# Patient Record
Sex: FEMALE | Race: BLACK OR AFRICAN AMERICAN | NOT HISPANIC OR LATINO | Employment: FULL TIME | URBAN - METROPOLITAN AREA
[De-identification: names, ages, dates, MRNs, and addresses within clinical notes are randomized per-mention and may not be internally consistent; named-entity substitution may affect disease eponyms.]

---

## 2017-09-06 ENCOUNTER — HISTORICAL (OUTPATIENT)
Dept: ADMINISTRATIVE | Facility: HOSPITAL | Age: 32
End: 2017-09-06

## 2017-09-09 LAB — FINAL CULTURE: NORMAL

## 2020-04-11 ENCOUNTER — HISTORICAL (OUTPATIENT)
Dept: LAB | Facility: HOSPITAL | Age: 35
End: 2020-04-11

## 2020-04-11 LAB — SARS-COV-2 RNA RESP QL NAA+PROBE: NOT DETECTED

## 2020-07-09 ENCOUNTER — HISTORICAL (OUTPATIENT)
Dept: RADIOLOGY | Facility: HOSPITAL | Age: 35
End: 2020-07-09

## 2021-09-21 ENCOUNTER — HISTORICAL (OUTPATIENT)
Dept: ADMINISTRATIVE | Facility: HOSPITAL | Age: 36
End: 2021-09-21

## 2022-03-31 ENCOUNTER — HISTORICAL (OUTPATIENT)
Dept: ADMINISTRATIVE | Facility: HOSPITAL | Age: 37
End: 2022-03-31

## 2022-04-27 ENCOUNTER — HISTORICAL (OUTPATIENT)
Dept: ADMINISTRATIVE | Facility: HOSPITAL | Age: 37
End: 2022-04-27
Payer: MEDICAID

## 2022-08-01 ENCOUNTER — HOSPITAL ENCOUNTER (EMERGENCY)
Facility: HOSPITAL | Age: 37
Discharge: HOME | End: 2022-08-01
Attending: STUDENT IN AN ORGANIZED HEALTH CARE EDUCATION/TRAINING PROGRAM
Payer: MEDICAID

## 2022-08-01 VITALS
HEIGHT: 66 IN | TEMPERATURE: 99 F | RESPIRATION RATE: 20 BRPM | WEIGHT: 270 LBS | DIASTOLIC BLOOD PRESSURE: 91 MMHG | HEART RATE: 67 BPM | SYSTOLIC BLOOD PRESSURE: 151 MMHG | OXYGEN SATURATION: 99 % | BODY MASS INDEX: 43.39 KG/M2

## 2022-08-01 DIAGNOSIS — R07.89 ATYPICAL CHEST PAIN: Primary | ICD-10-CM

## 2022-08-01 LAB
ALBUMIN SERPL-MCNC: 4.3 GM/DL (ref 3.5–5)
ALBUMIN/GLOB SERPL: 1.1 RATIO (ref 1.1–2)
ALP SERPL-CCNC: 52 UNIT/L (ref 40–150)
ALT SERPL-CCNC: 7 UNIT/L (ref 0–55)
AMORPH URATE CRY URNS QL MICRO: ABNORMAL /HPF
APPEARANCE UR: ABNORMAL
AST SERPL-CCNC: 12 UNIT/L (ref 5–34)
BACTERIA #/AREA URNS AUTO: ABNORMAL /HPF
BASOPHILS # BLD AUTO: 0.05 X10(3)/MCL (ref 0–0.2)
BASOPHILS NFR BLD AUTO: 0.6 %
BILIRUB UR QL STRIP.AUTO: NEGATIVE MG/DL
BILIRUBIN DIRECT+TOT PNL SERPL-MCNC: 0.3 MG/DL
BUN SERPL-MCNC: 12.8 MG/DL (ref 7–18.7)
CALCIUM SERPL-MCNC: 10 MG/DL (ref 8.4–10.2)
CHLORIDE SERPL-SCNC: 103 MMOL/L (ref 98–107)
CO2 SERPL-SCNC: 22 MMOL/L (ref 22–29)
COLOR UR AUTO: YELLOW
CREAT SERPL-MCNC: 0.83 MG/DL (ref 0.55–1.02)
EOSINOPHIL # BLD AUTO: 0.04 X10(3)/MCL (ref 0–0.9)
EOSINOPHIL NFR BLD AUTO: 0.5 %
ERYTHROCYTE [DISTWIDTH] IN BLOOD BY AUTOMATED COUNT: 12.6 % (ref 11.5–17)
GLOBULIN SER-MCNC: 3.9 GM/DL (ref 2.4–3.5)
GLUCOSE SERPL-MCNC: 104 MG/DL (ref 74–100)
GLUCOSE UR QL STRIP.AUTO: NEGATIVE MG/DL
HCT VFR BLD AUTO: 42.3 % (ref 37–47)
HGB BLD-MCNC: 13.9 GM/DL (ref 12–16)
IMM GRANULOCYTES # BLD AUTO: 0.02 X10(3)/MCL (ref 0–0.04)
IMM GRANULOCYTES NFR BLD AUTO: 0.2 %
KETONES UR QL STRIP.AUTO: NEGATIVE MG/DL
LEUKOCYTE ESTERASE UR QL STRIP.AUTO: ABNORMAL UNIT/L
LIPASE SERPL-CCNC: 27 U/L
LYMPHOCYTES # BLD AUTO: 2.33 X10(3)/MCL (ref 0.6–4.6)
LYMPHOCYTES NFR BLD AUTO: 28.2 %
MCH RBC QN AUTO: 28.4 PG (ref 27–31)
MCHC RBC AUTO-ENTMCNC: 32.9 MG/DL (ref 33–36)
MCV RBC AUTO: 86.5 FL (ref 80–94)
MONOCYTES # BLD AUTO: 0.61 X10(3)/MCL (ref 0.1–1.3)
MONOCYTES NFR BLD AUTO: 7.4 %
MUCOUS THREADS URNS QL MICRO: ABNORMAL /LPF
NEUTROPHILS # BLD AUTO: 5.2 X10(3)/MCL (ref 2.1–9.2)
NEUTROPHILS NFR BLD AUTO: 63.1 %
NITRITE UR QL STRIP.AUTO: NEGATIVE
NRBC BLD AUTO-RTO: 0 %
PH UR STRIP.AUTO: 5 [PH]
PLATELET # BLD AUTO: 368 X10(3)/MCL (ref 130–400)
PMV BLD AUTO: 8.3 FL (ref 7.4–10.4)
POTASSIUM SERPL-SCNC: 3.7 MMOL/L (ref 3.5–5.1)
PROT SERPL-MCNC: 8.2 GM/DL (ref 6.4–8.3)
PROT UR QL STRIP.AUTO: NEGATIVE MG/DL
RBC # BLD AUTO: 4.89 X10(6)/MCL (ref 4.2–5.4)
RBC #/AREA URNS AUTO: ABNORMAL /HPF
RBC UR QL AUTO: NEGATIVE UNIT/L
SODIUM SERPL-SCNC: 138 MMOL/L (ref 136–145)
SP GR UR STRIP.AUTO: >=1.03
SQUAMOUS #/AREA URNS AUTO: ABNORMAL /HPF
TROPONIN I SERPL-MCNC: <0.01 NG/ML (ref 0–0.04)
TSH SERPL-ACNC: 0.7 UIU/ML (ref 0.35–4.94)
UROBILINOGEN UR STRIP-ACNC: 0.2 MG/DL
WBC # SPEC AUTO: 8.3 X10(3)/MCL (ref 4.5–11.5)
WBC #/AREA URNS AUTO: ABNORMAL /HPF

## 2022-08-01 PROCEDURE — 81001 URINALYSIS AUTO W/SCOPE: CPT | Performed by: STUDENT IN AN ORGANIZED HEALTH CARE EDUCATION/TRAINING PROGRAM

## 2022-08-01 PROCEDURE — 99285 EMERGENCY DEPT VISIT HI MDM: CPT | Mod: 25

## 2022-08-01 PROCEDURE — 36415 COLL VENOUS BLD VENIPUNCTURE: CPT | Performed by: STUDENT IN AN ORGANIZED HEALTH CARE EDUCATION/TRAINING PROGRAM

## 2022-08-01 PROCEDURE — 84443 ASSAY THYROID STIM HORMONE: CPT | Performed by: STUDENT IN AN ORGANIZED HEALTH CARE EDUCATION/TRAINING PROGRAM

## 2022-08-01 PROCEDURE — 84484 ASSAY OF TROPONIN QUANT: CPT | Performed by: STUDENT IN AN ORGANIZED HEALTH CARE EDUCATION/TRAINING PROGRAM

## 2022-08-01 PROCEDURE — 80053 COMPREHEN METABOLIC PANEL: CPT | Performed by: STUDENT IN AN ORGANIZED HEALTH CARE EDUCATION/TRAINING PROGRAM

## 2022-08-01 PROCEDURE — 83690 ASSAY OF LIPASE: CPT | Performed by: STUDENT IN AN ORGANIZED HEALTH CARE EDUCATION/TRAINING PROGRAM

## 2022-08-01 PROCEDURE — 85025 COMPLETE CBC W/AUTO DIFF WBC: CPT | Performed by: STUDENT IN AN ORGANIZED HEALTH CARE EDUCATION/TRAINING PROGRAM

## 2022-08-02 DIAGNOSIS — N83.201 BILATERAL OVARIAN CYSTS: Primary | ICD-10-CM

## 2022-08-02 DIAGNOSIS — N83.202 BILATERAL OVARIAN CYSTS: Primary | ICD-10-CM

## 2022-08-02 NOTE — ED PROVIDER NOTES
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  Patient states that sitting ongoing for last few months.  States she was seen at an outside facility and was discharged after negative workup.  She states she has appointment with a cardiologist on 08/29.  Patient states that she has been having more discomfort for last 2 days and decided to come get checked out today.  Patient states the pain is worse with movement and better with rest.  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  She denies having any worsening pain on exertion.  Denies any shortness of breath or pain with breathing.  No dizziness or lightheadedness.  No abdominal pain, nausea vomiting constipation or diarrhea.  States that the pain is a dull pain.  States she is currently at a 4/10 pain at worst is 10/10.    Review of patient's allergies indicates:   Allergen Reactions    Aspirin Hives     Past Medical History:   Diagnosis Date    Hypertension     Obesity      Past Surgical History:   Procedure Laterality Date    BILATERAL TUBAL LIGATION      CHOLECYSTECTOMY       History reviewed. No pertinent family history.  Social History     Tobacco Use    Smoking status: Never Smoker    Smokeless tobacco: Never Used     Review of Systems   Constitutional: Negative for fever.   Respiratory: Negative for cough and shortness of breath.    Cardiovascular: Positive for chest pain.   Gastrointestinal: Negative for abdominal pain.   All other systems reviewed and are negative.      Physical Exam     Initial Vitals [08/01/22 1809]   BP Pulse Resp Temp SpO2   (!) 155/111 100 20 98.6 °F  (37 °C) 100 %      MAP       --         Physical Exam    Nursing note and vitals reviewed.  Constitutional: She appears well-developed and well-nourished. She is Obese . No distress.   Cardiovascular: Normal rate and regular rhythm.   Pulmonary/Chest: Breath sounds normal. No respiratory distress. She exhibits tenderness (Mild left-sided sternal costal border tenderness with palpation reproducing patient's complaint).   Abdominal: Abdomen is soft. Bowel sounds are normal. There is no abdominal tenderness. There is no rebound.   Musculoskeletal:         General: No tenderness. Normal range of motion.     Neurological: She is alert and oriented to person, place, and time.   Skin: Skin is warm. Capillary refill takes less than 2 seconds.   Psychiatric: She has a normal mood and affect. Thought content normal.         ED Course   Procedures  Labs Reviewed   COMPREHENSIVE METABOLIC PANEL - Abnormal; Notable for the following components:       Result Value    Glucose Level 104 (*)     Globulin 3.9 (*)     All other components within normal limits   URINALYSIS, REFLEX TO URINE CULTURE - Abnormal; Notable for the following components:    Appearance, UA Hazy (*)     Leukocyte Esterase, UA Small (*)     All other components within normal limits   CBC WITH DIFFERENTIAL - Abnormal; Notable for the following components:    MCHC 32.9 (*)     All other components within normal limits   URINALYSIS, MICROSCOPIC - Abnormal; Notable for the following components:    Bacteria, UA Few (*)     Mucous, UA Trace (*)     Amporphous Urate Crystals, UA Few (*)     WBC, UA 6-10 (*)     Squamous Epithelial Cells, UA Many (*)     All other components within normal limits   LIPASE - Normal   TROPONIN I - Normal   TSH - Normal   CBC W/ AUTO DIFFERENTIAL    Narrative:     The following orders were created for panel order CBC auto differential.  Procedure                               Abnormality         Status                     ---------                                -----------         ------                     CBC with Differential[[id number]]        Abnormal            Final result                 Please view results for these tests on the individual orders.     EKG Readings: (Independently Interpreted)   Initial Reading: No STEMI. Rhythm: Normal Sinus Rhythm. Heart Rate: 79. Ectopy: No Ectopy. Conduction: Normal. ST Segments: Normal ST Segments. T Waves: Normal. Clinical Impression: Normal Sinus Rhythm       Imaging Results          X-Ray Chest AP Portable (Preliminary result)  Result time 08/01/22 19:33:20    ED Interpretation by Andres Ugalde MD (08/01/22 19:33:20, [facility] - Emergency Dept, Emergency Medicine)    No consolidations appreciated.                               Medications - No data to display  Medical Decision Making:   Differential Diagnosis:   ACS, atypical chest pain, costochondritis, GERD, pancreatitis             ED Course as of 08/01/22 2037   Mon Aug 01, 2022   2034 Patient resting comfortably in bed no acute distress.  Vital signs are stable.  No chest pain unless palpated to the pec muscle on the left.  Patient will follow-up with PCP and [facility].  Strict ER precautions given. [BS]      ED Course User Index  [BS] Andres Ugalde MD             Clinical Impression:   Final diagnoses:  [R07.89] Atypical chest pain (Primary)          ED Disposition Condition    Discharge Stable        ED Prescriptions     None        Follow-up Information     Follow up With Specialties Details Why Contact Info    [facility] - Emergency Dept Emergency Medicine Go to  If symptoms worsen [id number]  [facility] [address] [phone]    Follow-up with PCP and cardiologist               Andres Ugalde MD  08/01/22 2037

## 2022-08-11 ENCOUNTER — HOSPITAL ENCOUNTER (OUTPATIENT)
Dept: RADIOLOGY | Facility: HOSPITAL | Age: 37
Discharge: HOME | End: 2022-08-11
Attending: NURSE PRACTITIONER
Payer: MEDICAID

## 2022-08-11 DIAGNOSIS — N83.202 BILATERAL OVARIAN CYSTS: ICD-10-CM

## 2022-08-11 DIAGNOSIS — N83.201 BILATERAL OVARIAN CYSTS: ICD-10-CM

## 2022-08-11 PROCEDURE — 76830 TRANSVAGINAL US NON-OB: CPT | Mod: TC

## 2022-08-26 ENCOUNTER — HOSPITAL ENCOUNTER (EMERGENCY)
Facility: HOSPITAL | Age: 37
Discharge: HOME | End: 2022-08-26
Attending: FAMILY MEDICINE
Payer: MEDICAID

## 2022-08-26 VITALS
WEIGHT: 260 LBS | HEART RATE: 104 BPM | DIASTOLIC BLOOD PRESSURE: 104 MMHG | TEMPERATURE: 101 F | OXYGEN SATURATION: 98 % | RESPIRATION RATE: 20 BRPM | HEIGHT: 66 IN | SYSTOLIC BLOOD PRESSURE: 142 MMHG | BODY MASS INDEX: 41.78 KG/M2

## 2022-08-26 DIAGNOSIS — U07.1 COVID-19: ICD-10-CM

## 2022-08-26 DIAGNOSIS — J06.9 UPPER RESPIRATORY TRACT INFECTION, UNSPECIFIED TYPE: Primary | ICD-10-CM

## 2022-08-26 LAB
FLUAV AG UPPER RESP QL IA.RAPID: NOT DETECTED
FLUBV AG UPPER RESP QL IA.RAPID: NOT DETECTED
SARS-COV-2 RNA RESP QL NAA+PROBE: DETECTED

## 2022-08-26 PROCEDURE — 99284 EMERGENCY DEPT VISIT MOD MDM: CPT | Mod: 25

## 2022-08-26 PROCEDURE — 87636 SARSCOV2 & INF A&B AMP PRB: CPT | Performed by: FAMILY MEDICINE

## 2022-08-26 PROCEDURE — 25000003 PHARM REV CODE 250: Performed by: FAMILY MEDICINE

## 2022-08-26 RX ORDER — BENZONATATE 100 MG/1
100 CAPSULE ORAL 3 TIMES DAILY PRN
Qty: 20 CAPSULE | Refills: 0 | Status: SHIPPED | OUTPATIENT
Start: 2022-08-26 | End: 2022-09-16

## 2022-08-26 RX ORDER — ACETAMINOPHEN 500 MG
1000 TABLET ORAL
Status: COMPLETED | OUTPATIENT
Start: 2022-08-26 | End: 2022-08-26

## 2022-08-26 RX ORDER — ONDANSETRON 4 MG/1
4 TABLET, ORALLY DISINTEGRATING ORAL EVERY 6 HOURS PRN
Qty: 10 TABLET | Refills: 0 | Status: SHIPPED | OUTPATIENT
Start: 2022-08-26 | End: 2022-08-31

## 2022-08-26 RX ORDER — CETIRIZINE HYDROCHLORIDE 10 MG/1
10 TABLET ORAL DAILY
Qty: 14 TABLET | Refills: 0 | Status: ON HOLD | OUTPATIENT
Start: 2022-08-26 | End: 2023-11-17

## 2022-08-26 RX ORDER — FLUTICASONE PROPIONATE 50 MCG
2 SPRAY, SUSPENSION (ML) NASAL DAILY
Qty: 15 G | Refills: 0 | Status: ON HOLD | OUTPATIENT
Start: 2022-08-26 | End: 2023-11-17 | Stop reason: HOSPADM

## 2022-08-26 RX ADMIN — ACETAMINOPHEN 1000 MG: 500 TABLET ORAL at 09:08

## 2022-08-26 NOTE — ED TRIAGE NOTES
C/o fever of 101.3, nasal congestion, body aches, and coughing x 2 days. Febrile and HTN in triage

## 2022-08-26 NOTE — Clinical Note
"Sonia"Wendy Stock was seen and treated in our emergency department on 8/26/2022.     COVID-19 is present in our communities across the state. There is limited testing for COVID at this time, so not all patients can be tested. In this situation, your employee meets the following criteria:    Sonia Stock has met the criteria for COVID-19 testing and has a POSITIVE result. She can return to work once they are asymptomatic for 24 hours without the use of fever reducing medications AND at least five days from the first positive result. A mask is recommended for 5 days post quarantine.     If you have any questions or concerns, or if I can be of further assistance, please do not hesitate to contact me.    Sincerely,             Yolanda GLORIA"

## 2022-08-26 NOTE — Clinical Note
"Sonia"Wendy Stock was seen and treated in our emergency department on 8/26/2022.     COVID-19 is present in our communities across the state. There is limited testing for COVID at this time, so not all patients can be tested. In this situation, your employee meets the following criteria:    Sonia Stock has met the criteria for COVID-19 testing and has a POSITIVE result. She can return to work once they are asymptomatic for 24 hours without the use of fever reducing medications AND at least five days from the first positive result. A mask is recommended for 5 days post quarantine.     If you have any questions or concerns, or if I can be of further assistance, please do not hesitate to contact me.    Sincerely,             pancho GLORIA"

## 2022-08-26 NOTE — ED PROVIDER NOTES
Encounter Date: 8/26/2022       History     Chief Complaint   Patient presents with    Fever     47 y/o female presents to the ER with complaints of cough, body aches and fever that began yesterday afternoon.  Symptoms are moderate, nothing makes better or worse.  No chest pain, no dyspnea, no abdominal pain, nausea or vomiting.  History of hypertension, but did not take medications this morning.        Review of patient's allergies indicates:   Allergen Reactions    Aspirin Hives     Past Medical History:   Diagnosis Date    Hypertension     Obesity      Past Surgical History:   Procedure Laterality Date    BILATERAL TUBAL LIGATION      CHOLECYSTECTOMY      DILATION AND CURETTAGE OF UTERUS      TUBAL LIGATION       History reviewed. No pertinent family history.  Social History     Tobacco Use    Smoking status: Never Smoker    Smokeless tobacco: Never Used     Review of Systems   Constitutional: Positive for chills, fatigue and fever.   HENT: Positive for congestion, rhinorrhea and sore throat. Negative for ear pain.    Eyes: Negative for photophobia and pain.   Respiratory: Positive for cough. Negative for shortness of breath and wheezing.    Cardiovascular: Negative for chest pain.   Gastrointestinal: Negative for abdominal pain, diarrhea, nausea and vomiting.   Genitourinary: Negative for dysuria.   Musculoskeletal: Negative for back pain.   Skin: Negative for rash.   Neurological: Positive for headaches. Negative for dizziness and weakness.   Hematological: Does not bruise/bleed easily.   All other systems reviewed and are negative.      Physical Exam     Initial Vitals [08/26/22 0901]   BP Pulse Resp Temp SpO2   (!) 142/104 104 20 (!) 100.7 °F (38.2 °C) 98 %      MAP       --         Physical Exam    Nursing note and vitals reviewed.  Constitutional: She appears well-developed and well-nourished. No distress.   HENT:   Head: Normocephalic and atraumatic.   Mouth/Throat: Oropharynx is clear and moist.    Eyes: Conjunctivae and EOM are normal. Pupils are equal, round, and reactive to light.   Neck: Neck supple.   Normal range of motion.  Cardiovascular: Normal rate, regular rhythm and normal heart sounds.   Pulmonary/Chest: Breath sounds normal. No respiratory distress. She has no wheezes. She has no rhonchi. She has no rales.   Abdominal: Abdomen is soft. Bowel sounds are normal. She exhibits no distension. There is no abdominal tenderness. There is no rebound and no guarding.   Musculoskeletal:         General: Normal range of motion.      Cervical back: Normal range of motion and neck supple.     Neurological: She is alert and oriented to person, place, and time.   Skin: Skin is warm and dry. Capillary refill takes less than 2 seconds. No erythema.   Psychiatric: She has a normal mood and affect. Her behavior is normal. Judgment and thought content normal.         ED Course   Procedures  Labs Reviewed   COVID/FLU A&B PCR - Abnormal; Notable for the following components:       Result Value    SARS-CoV-2 PCR Detected (*)     All other components within normal limits          Imaging Results    None          Medications   acetaminophen tablet 1,000 mg (1,000 mg Oral Given 8/26/22 0912)     Medical Decision Making:   Differential Diagnosis:   Upper respiratory infection, COVID 19.                      Clinical Impression:   Final diagnoses:  [J06.9] Upper respiratory tract infection, unspecified type (Primary)  [U07.1] COVID-19          ED Disposition Condition    Discharge Stable        ED Prescriptions     Medication Sig Dispense Start Date End Date Auth. Provider    benzonatate (TESSALON) 100 MG capsule Take 1 capsule (100 mg total) by mouth 3 (three) times daily as needed for Cough. 20 capsule 8/26/2022 9/16/2022 Faustino Forman MD    fluticasone propionate (FLONASE) 50 mcg/actuation nasal spray 2 sprays (100 mcg total) by Each Nostril route once daily at 6am. 15 g 8/26/2022  Faustino Forman MD     cetirizine (ZYRTEC) 10 MG tablet Take 1 tablet (10 mg total) by mouth once daily. for 14 days 14 tablet 8/26/2022 9/9/2022 Faustino Forman MD    ondansetron (ZOFRAN-ODT) 4 MG TbDL Take 1 tablet (4 mg total) by mouth every 6 (six) hours as needed (nausea vomiting). 10 tablet 8/26/2022 8/31/2022 Faustino Forman MD        Follow-up Information     Follow up With Specialties Details Why Contact Info    [facility] - Emergency Dept Emergency Medicine  As needed, If symptoms worsen [address]  [facility] [address] [phone]    Primary Care Physician  In 5 days             Faustino Forman MD  08/26/22 8698

## 2023-01-09 ENCOUNTER — HOSPITAL ENCOUNTER (EMERGENCY)
Facility: HOSPITAL | Age: 38
Discharge: HOME | End: 2023-01-09
Attending: EMERGENCY MEDICINE
Payer: MEDICAID

## 2023-01-09 VITALS
HEIGHT: 66 IN | BODY MASS INDEX: 38.57 KG/M2 | OXYGEN SATURATION: 98 % | HEART RATE: 67 BPM | TEMPERATURE: 98 F | RESPIRATION RATE: 18 BRPM | WEIGHT: 240 LBS | SYSTOLIC BLOOD PRESSURE: 153 MMHG | DIASTOLIC BLOOD PRESSURE: 106 MMHG

## 2023-01-09 DIAGNOSIS — R07.9 CHEST PAIN: ICD-10-CM

## 2023-01-09 DIAGNOSIS — R07.89 CHEST WALL PAIN: Primary | ICD-10-CM

## 2023-01-09 PROCEDURE — 93010 EKG 12-LEAD: ICD-10-PCS | Mod: ,,, | Performed by: INTERNAL MEDICINE

## 2023-01-09 PROCEDURE — 63600175 PHARM REV CODE 636 W HCPCS: Performed by: EMERGENCY MEDICINE

## 2023-01-09 PROCEDURE — 96372 THER/PROPH/DIAG INJ SC/IM: CPT | Performed by: EMERGENCY MEDICINE

## 2023-01-09 PROCEDURE — 93010 ELECTROCARDIOGRAM REPORT: CPT | Mod: ,,, | Performed by: INTERNAL MEDICINE

## 2023-01-09 PROCEDURE — 99284 EMERGENCY DEPT VISIT MOD MDM: CPT | Mod: 25

## 2023-01-09 PROCEDURE — 93005 ELECTROCARDIOGRAM TRACING: CPT

## 2023-01-09 RX ORDER — KETOROLAC TROMETHAMINE 10 MG/1
10 TABLET, FILM COATED ORAL EVERY 6 HOURS
Qty: 20 TABLET | Refills: 0 | Status: SHIPPED | OUTPATIENT
Start: 2023-01-09 | End: 2023-01-14

## 2023-01-09 RX ORDER — KETOROLAC TROMETHAMINE 30 MG/ML
60 INJECTION, SOLUTION INTRAMUSCULAR; INTRAVENOUS
Status: COMPLETED | OUTPATIENT
Start: 2023-01-09 | End: 2023-01-09

## 2023-01-09 RX ORDER — CYCLOBENZAPRINE HCL 10 MG
10 TABLET ORAL 3 TIMES DAILY PRN
Qty: 15 TABLET | Refills: 0 | Status: SHIPPED | OUTPATIENT
Start: 2023-01-09 | End: 2023-01-14

## 2023-01-09 RX ORDER — HYDROCODONE BITARTRATE AND ACETAMINOPHEN 7.5; 325 MG/1; MG/1
1 TABLET ORAL EVERY 6 HOURS PRN
Qty: 20 TABLET | Refills: 0 | Status: SHIPPED | OUTPATIENT
Start: 2023-01-09 | End: 2023-01-14

## 2023-01-09 RX ADMIN — KETOROLAC TROMETHAMINE 60 MG: 30 INJECTION, SOLUTION INTRAMUSCULAR; INTRAVENOUS at 02:01

## 2023-01-09 NOTE — ED TRIAGE NOTES
Pt complaint of chest and left upper back pain today with movement  relating that she was dylan by Dr Ag last week with a normal check up, except that she hasnot gotten cholesterol RX yet. Pt denies injury

## 2023-01-09 NOTE — ED PROVIDER NOTES
Encounter Date: 1/9/2023       History     Chief Complaint   Patient presents with    Pleurisy     Pt complaint of chest and left upper back pain today with movement  relating that she was dylan by Dr Ag last week with a normal check up, except that she hasnot gotten cholesterol RX yet. Pt denies injury     The history is provided by the patient. No  was used.   Chest Pain  The current episode started today (woke with symptoms this AM). Duration of episode(s) is 1 day. Chest pain occurs constantly. The quality of the pain is described as aching. The pain does not radiate. Chest pain is worsened by certain positions and deep breathing. Pertinent negatives for primary symptoms include no fever, no shortness of breath and no nausea.   Pertinent negatives for associated symptoms include no weakness. She tried nothing for the symptoms. Risk factors include obesity.   Her past medical history is significant for hyperlipidemia and hypertension.   Woke this AM with left scapular pain radiating into left chest.  Denies injury/trauma.  Was in her normal state of health when she went to bed last night.    Review of patient's allergies indicates:   Allergen Reactions    Aspirin Hives     Past Medical History:   Diagnosis Date    Hypertension     Obesity      Past Surgical History:   Procedure Laterality Date    BILATERAL TUBAL LIGATION      CHOLECYSTECTOMY      DILATION AND CURETTAGE OF UTERUS      TUBAL LIGATION       No family history on file.  Social History     Tobacco Use    Smoking status: Never    Smokeless tobacco: Never     Review of Systems   Constitutional:  Negative for fever.   HENT:  Negative for sore throat.    Respiratory:  Negative for shortness of breath.    Cardiovascular:  Positive for chest pain.   Gastrointestinal:  Negative for nausea.   Genitourinary:  Negative for dysuria.   Musculoskeletal:  Negative for back pain.   Skin:  Negative for rash.   Neurological:  Negative for  weakness.   Hematological:  Does not bruise/bleed easily.     Physical Exam     Initial Vitals [01/09/23 1333]   BP Pulse Resp Temp SpO2   (!) 153/106 67 18 98 °F (36.7 °C) 98 %      MAP       --         Physical Exam    Nursing note and vitals reviewed.  Constitutional: She appears well-developed and well-nourished.   HENT:   Head: Normocephalic and atraumatic.   Right Ear: External ear normal.   Left Ear: External ear normal.   Eyes: Conjunctivae and EOM are normal. Pupils are equal, round, and reactive to light.   Neck: Neck supple.   Normal range of motion.  Cardiovascular:  Normal rate, regular rhythm, normal heart sounds and intact distal pulses.           Pulmonary/Chest: Breath sounds normal.   Abdominal: Abdomen is soft. Bowel sounds are normal.   Musculoskeletal:         General: Normal range of motion.        Arms:       Cervical back: Normal range of motion and neck supple.        Back:      Neurological: She is alert and oriented to person, place, and time. GCS score is 15. GCS eye subscore is 4. GCS verbal subscore is 5. GCS motor subscore is 6.   Skin: Skin is warm and dry. Capillary refill takes less than 2 seconds.   Psychiatric: She has a normal mood and affect. Her behavior is normal. Judgment and thought content normal.       ED Course   Procedures  Labs Reviewed - No data to display  EKG Readings: (Independently Interpreted)   Initial Reading: No STEMI. Rhythm: Normal Sinus Rhythm. Heart Rate: 60. Ectopy: No Ectopy. Conduction: Normal. ST Segments: Normal ST Segments. T Waves: Normal. Axis: Normal. Clinical Impression: Normal Sinus Rhythm     Imaging Results              X-Ray Chest PA And Lateral (Final result)  Result time 01/09/23 14:04:50      Final result by Brett Burroughs MD (01/09/23 14:04:50)                   Impression:      No acute cardiopulmonary process identified.      Electronically signed by: Brett Burroughs  Date:    01/09/2023  Time:    14:04               Narrative:     EXAMINATION:  XR CHEST PA AND LATERAL    CLINICAL HISTORY:  Chest pain, unspecified    TECHNIQUE:  Two views    COMPARISON:  August 1, 2022.    FINDINGS:  Cardiopericardial silhouette is within normal limits. Lungs are without dense focal or segmental consolidation, congestion, pleural effusion or pneumothorax.                                  Pain appears to be MSK in nature.  Will get EKG and CXR to r/o more serious causes of CP.  She is allergic to ASA but tolerates other NSAID's so will order IM ketorolac.  If CXR and EKG are unremarkable, will treat with analgesics and muscle relaxants.     Medications   ketorolac injection 60 mg (60 mg Intramuscular Given 1/9/23 0060)         EKG and CXR normal. Will treat for MSK etiology                     Clinical Impression:   Final diagnoses:  [R07.9] Chest pain  [R07.89] Chest wall pain (Primary)        ED Disposition Condition    Discharge Stable          ED Prescriptions       Medication Sig Dispense Start Date End Date Auth. Provider    ketorolac (TORADOL) 10 mg tablet Take 1 tablet (10 mg total) by mouth every 6 (six) hours. for 5 days 20 tablet 1/9/2023 1/14/2023 Tony Padilla MD    HYDROcodone-acetaminophen (NORCO) 7.5-325 mg per tablet Take 1 tablet by mouth every 6 (six) hours as needed for Pain. 20 tablet 1/9/2023 1/14/2023 Tony Padilla MD    cyclobenzaprine (FLEXERIL) 10 MG tablet Take 1 tablet (10 mg total) by mouth 3 (three) times daily as needed for Muscle spasms. 15 tablet 1/9/2023 1/14/2023 Tony Padilla MD          Follow-up Information       Follow up With Specialties Details Why Contact Info    Follow up with your primary MD in 3-5 days if not improved.  Return to ED for worsening symptoms.                 Tony Padilla MD  01/09/23 8892

## 2023-03-23 ENCOUNTER — HOSPITAL ENCOUNTER (EMERGENCY)
Facility: HOSPITAL | Age: 38
Discharge: HOME | End: 2023-03-23
Attending: INTERNAL MEDICINE
Payer: MEDICAID

## 2023-03-23 VITALS
SYSTOLIC BLOOD PRESSURE: 149 MMHG | TEMPERATURE: 98 F | HEIGHT: 66 IN | DIASTOLIC BLOOD PRESSURE: 105 MMHG | OXYGEN SATURATION: 98 % | WEIGHT: 240 LBS | RESPIRATION RATE: 20 BRPM | BODY MASS INDEX: 38.57 KG/M2 | HEART RATE: 67 BPM

## 2023-03-23 DIAGNOSIS — T78.49XA ALLERGIC REACTION TO ADHESIVE: ICD-10-CM

## 2023-03-23 DIAGNOSIS — R60.0 PERIORBITAL EDEMA OF BOTH EYES: Primary | ICD-10-CM

## 2023-03-23 PROCEDURE — 96372 THER/PROPH/DIAG INJ SC/IM: CPT | Performed by: INTERNAL MEDICINE

## 2023-03-23 PROCEDURE — 99284 EMERGENCY DEPT VISIT MOD MDM: CPT

## 2023-03-23 PROCEDURE — 63600175 PHARM REV CODE 636 W HCPCS: Performed by: INTERNAL MEDICINE

## 2023-03-23 RX ORDER — METHYLPREDNISOLONE SOD SUCC 125 MG
125 VIAL (EA) INJECTION ONCE
Status: COMPLETED | OUTPATIENT
Start: 2023-03-23 | End: 2023-03-23

## 2023-03-23 RX ORDER — DIPHENHYDRAMINE HYDROCHLORIDE 50 MG/ML
50 INJECTION INTRAMUSCULAR; INTRAVENOUS ONCE
Status: COMPLETED | OUTPATIENT
Start: 2023-03-23 | End: 2023-03-23

## 2023-03-23 RX ORDER — PREDNISONE 20 MG/1
20 TABLET ORAL DAILY
Qty: 5 TABLET | Refills: 0 | Status: SHIPPED | OUTPATIENT
Start: 2023-03-23 | End: 2023-03-28

## 2023-03-23 RX ADMIN — METHYLPREDNISOLONE SODIUM SUCCINATE 125 MG: 125 INJECTION, POWDER, FOR SOLUTION INTRAMUSCULAR; INTRAVENOUS at 09:03

## 2023-03-23 RX ADMIN — DIPHENHYDRAMINE HYDROCHLORIDE 50 MG: 50 INJECTION, SOLUTION INTRAMUSCULAR; INTRAVENOUS at 09:03

## 2023-03-23 NOTE — Clinical Note
"Sonia Brantley" Montrell was seen and treated in our emergency department on 3/23/2023.  She may return to work on 03/24/2023.       If you have any questions or concerns, please don't hesitate to call.      Bessie     "

## 2023-03-23 NOTE — Clinical Note
"Sonia Brantley" Montrell was seen and treated in our emergency department on 3/23/2023.  She may return to work on 03/24/2023.       If you have any questions or concerns, please don't hesitate to call.      Bessie GLORIA    "

## 2023-03-23 NOTE — ED PROVIDER NOTES
Source of History:  Patient, no limitations    Chief complaint:  Facial Swelling (Pt to er c/o redness, swelling and irritation to bilateral eyelids onset two days ago.)      HPI:  Sonia Stock is a 37 y.o. female presenting with Facial Swelling (Pt to er c/o redness, swelling and irritation to bilateral eyelids onset two days ago.)         Patient presents for evaluation of facial swelling. Onset of symptoms was abrupt starting 2 days ago, with unchanged since that time. Respiratory symptoms include  none . Patient denies dyspnea on exertion, dyspnea while laying down, non productive cough, shortness of breath, and wheezing. Patient denies similar previous allergic reactions. Patient has exposure to new medications or allergens. Care prior to arrival consisted of OTC benadryl, with minimal relief.        Review of Systems   Constitutional symptoms:  Negative except as documented in HPI.   Skin symptoms:  Negative except as documented in HPI.   HEENT symptoms: No vision changes, No FB sensation, No voice changes, no difficulty swallowing  Respiratory symptoms:  Negative except as documented in HPI.   Cardiovascular symptoms:  Negative except as documented in HPI.   Gastrointestinal symptoms:  Negative except as documented in HPI.    Genitourinary symptoms:  Negative except as documented in HPI.   Musculoskeletal symptoms:  Negative except as documented in HPI.   Neurologic symptoms:  Negative except as documented in HPI.   Psychiatric symptoms:  Negative except as documented in HPI.   Allergy/immunologic symptoms:  Negative except as documented in HPI.             Additional review of systems information: All other systems reviewed and otherwise negative.      Review of patient's allergies indicates:   Allergen Reactions    Aspirin Hives       PMH:  As per HPI and below:    Past Medical History:   Diagnosis Date    Hypertension     Obesity         No family history on file.    Past Surgical History:  "  Procedure Laterality Date    BILATERAL TUBAL LIGATION      CHOLECYSTECTOMY      DILATION AND CURETTAGE OF UTERUS      TUBAL LIGATION         Social History     Tobacco Use    Smoking status: Never    Smokeless tobacco: Never       There is no problem list on file for this patient.       Physical Exam:    BP (!) 149/105 (BP Location: Left arm, Patient Position: Sitting)   Pulse 67   Temp 97.9 °F (36.6 °C) (Temporal)   Resp 20   Ht 5' 6" (1.676 m)   Wt 108.9 kg (240 lb)   LMP 03/23/2023 (Exact Date)   SpO2 98%   BMI 38.74 kg/m²     Nursing note and vital signs reviewed.    General:  Alert, no acute distress.   Skin: Normal for Ethnic Origin, No cyanosis  HEENT: Normocephalic and atraumatic, Vision unchanged, Pupils symmetric, No icterus , Nasal mucosa is pink and moist, BL periorbital edema including upper and lower eyelids  Cardiovascular:  Regular rate and rhythm, No edema  Chest Wall: No deformity, equal chest rise  Respiratory:  Lungs are clear to auscultation, respirations are non-labored.    Musculoskeletal:  No deformity, Normal perfusion to all extremities  Gastrointestinal:  Soft, Non distended  Neurological:  Alert and oriented, normal motor observed, normal speech observed.    Psychiatric:  Cooperative, appropriate mood & affect.        Labs that have been ordered have been independently reviewed and interpreted by myself.     Old Chart Reviewed.      Initial Impression/ Differential Dx:  Differential Diagnosis includes, but is not limited to:  Drug eruption, allergic reaction/urticatia, irritant/contact dermatitis, viral exanthem, local trauma/contusion, abrasion, cellulitis  MDM:      Reviewed Nurses Note.    Reviewed Pertinent old records.    Orders Placed This Encounter    Visual acuity screening    methylPREDNISolone sodium succinate injection 125 mg    diphenhydrAMINE injection 50 mg    predniSONE (DELTASONE) 20 MG tablet                    Labs Reviewed - No data to display       No orders " to display        No visits with results within 1 Day(s) from this visit.   Latest known visit with results is:   Admission on 08/26/2022, Discharged on 08/26/2022   Component Date Value Ref Range Status    Influenza A PCR 08/26/2022 Not Detected  Not Detected Final    Influenza B PCR 08/26/2022 Not Detected  Not Detected Final    SARS-CoV-2 PCR 08/26/2022 Detected (A)  Not Detected Final       Imaging Results    None                                              Diagnostic Impression:    1. Periorbital edema of both eyes    2. Allergic reaction to adhesive         ED Disposition Condition    Discharge Stable             Follow-up Information       [facility] - Emergency Dept.    Specialty: Emergency Medicine  Why: If symptoms worsen  Contact information:  [address]  [facility] [address] [phone]                            ED Prescriptions       Medication Sig Dispense Start Date End Date Auth. Provider    predniSONE (DELTASONE) 20 MG tablet Take 1 tablet (20 mg total) by mouth once daily. for 5 days 5 tablet 3/23/2023 3/28/2023 Bubba Walker,           Follow-up Information       Follow up With Specialties Details Why Contact Info    [facility] - Emergency Dept Emergency Medicine  If symptoms worsen [address]  [facility] [address]  [phone]             Bubba Walker DO  03/23/23 0914

## 2023-05-12 DIAGNOSIS — K27.9 PEPTIC ULCER WITHOUT HEMORRHAGE OR PERFORATION BUT WITH OBSTRUCTION: Primary | ICD-10-CM

## 2023-05-12 DIAGNOSIS — Z01.818 OTHER SPECIFIED PRE-OPERATIVE EXAMINATION: ICD-10-CM

## 2023-05-12 DIAGNOSIS — K56.609 PEPTIC ULCER WITHOUT HEMORRHAGE OR PERFORATION BUT WITH OBSTRUCTION: Primary | ICD-10-CM

## 2023-05-16 ENCOUNTER — CLINICAL SUPPORT (OUTPATIENT)
Dept: BARIATRICS | Facility: HOSPITAL | Age: 38
End: 2023-05-16

## 2023-05-16 VITALS — WEIGHT: 292 LBS | HEIGHT: 65 IN | BODY MASS INDEX: 48.65 KG/M2

## 2023-05-16 DIAGNOSIS — E66.9 OBESITY, UNSPECIFIED CLASSIFICATION, UNSPECIFIED OBESITY TYPE, UNSPECIFIED WHETHER SERIOUS COMORBIDITY PRESENT: Primary | ICD-10-CM

## 2023-05-16 NOTE — PROGRESS NOTES
"NUTRITIONAL CONSULT    Initial assessment for sleeve gastrectomy   Non Surgical: []    PAST HISTORY:   Dieting attempts include reducing portion sizes,    Highest weight: 298 lbs    CLINICAL DATA:  Height:   Ht Readings from Last 1 Encounters:   23 5' 5" (1.651 m)      Weight:   Wt Readings from Last 3 Encounters:   23 0944 132.5 kg (292 lb)   23 0835 108.9 kg (240 lb)   23 1333 108.9 kg (240 lb)      IBW: 125 lbs   BMI:    BMI Readings from Last 1 Encounters:   23 48.59 kg/m²      Bariatric goal weight (125% EBW): 156 lbs  Patient's goal weight: 145-150 lbs    FAMILY HISTORY OF OBESITY:   unsure            WHAT SIDE OF THE FAMILY?   []Mother   []Father   []Sibling   []Child     []Extended    []Adopted       Goal for Bariatric Surgery: to improve quality of life and to lose weight    NUTRITION & HEALTH HISTORY:  Greatest challenge: dining out frequency and irregular meal patterns    Current diet recall:   Cut back on sodas (drink 5-6 sodas a day) -started diet detailed below  5 am: (if at work will not eat until lunch time 9am -3pm)  9:30-10 am: B: grapefruit and eggs  1:30 L: salad with air fried fish, chicken, tuna, or salmon  Cook for little boy: use to eat with son: fried foods (french fries, fried chicken wings, chicken strips: small for his age and has strict food preferences)  D: fruit    Current Dietary Patterns:  Meal pattern: 1-2  Snackin / day Type: fruit  Vegetables: Likes a variety. Eats almost daily.  Fruits: Likes a variety. Eats daily.  Beverages: water  Alcohol consumption:  special occasions  Type: varies  Dining out:  3-4x  Mostly fast food. Stopped in the last week (gave healthy fast food options)  Grocery shopping and food prep: Yes[x]Self   []Spouse   []Other:   Emotional eating: No Which emotions if yes: n/a  Nighttime snacking: No Middle of night: No Before bedtime: No  Hx of disordered eating behaviors: No Anorexia: No Bulimia: No Purging: " No Binging:No  History of vitamin/mineral deficiencies:   No    Vitamins / Minerals / Herbs:   None at this time (asked about [place])    Food Allergies:   none      ASSESSMENT:  Patient reports attempts at weight loss, only to regain lost weight.  Patient demonstrated knowledge of healthy eating behaviors and exercise patterns; admits to not eating healthy and not exercising at this point.  Patient states willingness to change lifestyle and make behavior modifications.  Expect good  compliance after surgery at this time.        ESTIMATED NEEDS:  Calories: 3395-8037 (20-25 kcal/kg 71 kg adjusted BW/d)  Protein: 71-78.1 (1.0-1.1 g/kg adjusted BW/d)  Fluid:  2650 mL (90 oz) (20 mL/kg actual BW/d)    BARIATRIC DIET DISCUSSION:  Discussed diet after surgery and related to patients food record.  Reinforced that surgery is not a magic bullet and importance of low fat foods and no snacking.  Answered all questions.    RECOMMENDATIONS:  Patient is a good candidate for bariatric surgery.      PLAN:  Resume work-up for surgery.  Continue to review Bariatric Nutrition Guidebook at home and call with any questions.  Begin trying various protein supplements to determine preference.  Include protein source at every meal  Provided: Fueling Well on the Go, Snack List, Protein Supplement List, Protein food list, Bariatric Surgery Pocket Manual

## 2023-05-16 NOTE — PROGRESS NOTES
BEHAVIOR MODIFICATION AND EXERCISE CONSULT    PERSONAL:     What initiated your interest in bariatric surgery? Better quality of life. Tried to lose weight on my own, but it was slow. Have HTN, high cholesterol.     Marital Status: []Single   [x]   []   []      Do you have children? 2 (15 and 12)    What is your highest level of education completed? 12th grade and some college    Who is your social or relational support? sister    Do you work? 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 yes    PHYSICAL ACTIVITY:    Do you currently exercise: [x]Yes   []No    If so, please describe: [place], walk and run    Have you experienced any injuries and/or restrictions that may limit your physical activity? [x]Yes   []No    If so, please describe:     How do you feel about exercise? good    BEHAVIORS:    What behavior(s) would you like to change in order to be healthy? Snacking/sweets, soda    On a scale of 1-10 (1-extremely low, 10-extremely high), how motivated are you to change your behavior(s)? 10    Do you currently use any type of tobacco products (vape, dip, cigarettes, etc.) No    If yes, on average, how many and/or how often do you use these products on a daily basis?    How many hours of sleep do you average? 4    Rate your stress level on a scale of 1-10 (1-extremely low, 10-extremely high) 2    What is your biggest stressor?     Is your appetite affected by stress, boredom, depression, etc.?     How do you cope and/or manage stress?    Have you ever seen a counselor or therapist? Yes for anxiety      CURRENT WEIGHT: 292  HIGHEST WEIGHT: 298  LOWEST ADULT WEIGHT: 135  GOAL WEIGHT: 145-150    WAIST/HIP:48/52.5    HANDOUTS:Emotional connections to food.    NOTES: A body composition was conducted and patient was educated.      Juanita Marte, SHANA, CPT, CHC

## 2023-05-17 ENCOUNTER — HOSPITAL ENCOUNTER (EMERGENCY)
Facility: HOSPITAL | Age: 38
Discharge: HOME | End: 2023-05-17
Attending: EMERGENCY MEDICINE
Payer: MEDICAID

## 2023-05-17 VITALS
OXYGEN SATURATION: 98 % | HEIGHT: 65 IN | BODY MASS INDEX: 48.65 KG/M2 | HEART RATE: 80 BPM | TEMPERATURE: 97 F | RESPIRATION RATE: 20 BRPM | WEIGHT: 292 LBS | SYSTOLIC BLOOD PRESSURE: 133 MMHG | DIASTOLIC BLOOD PRESSURE: 95 MMHG

## 2023-05-17 DIAGNOSIS — R07.9 NONSPECIFIC CHEST PAIN: ICD-10-CM

## 2023-05-17 DIAGNOSIS — J20.9 ACUTE BRONCHITIS, UNSPECIFIED ORGANISM: Primary | ICD-10-CM

## 2023-05-17 DIAGNOSIS — R07.9 CHEST PAIN: ICD-10-CM

## 2023-05-17 LAB
ALBUMIN SERPL-MCNC: 4.3 G/DL (ref 3.5–5)
ALBUMIN/GLOB SERPL: 1.3 RATIO (ref 1.1–2)
ALP SERPL-CCNC: 54 UNIT/L (ref 40–150)
ALT SERPL-CCNC: 11 UNIT/L (ref 0–55)
AST SERPL-CCNC: 36 UNIT/L (ref 5–34)
BASOPHILS # BLD AUTO: 0.04 X10(3)/MCL
BASOPHILS NFR BLD AUTO: 0.9 %
BILIRUBIN DIRECT+TOT PNL SERPL-MCNC: 0.3 MG/DL
BUN SERPL-MCNC: 10.2 MG/DL (ref 7–18.7)
CALCIUM SERPL-MCNC: 9.3 MG/DL (ref 8.4–10.2)
CHLORIDE SERPL-SCNC: 103 MMOL/L (ref 98–107)
CO2 SERPL-SCNC: 25 MMOL/L (ref 22–29)
CREAT SERPL-MCNC: 0.97 MG/DL (ref 0.55–1.02)
EOSINOPHIL # BLD AUTO: 0.06 X10(3)/MCL (ref 0–0.9)
EOSINOPHIL NFR BLD AUTO: 1.4 %
ERYTHROCYTE [DISTWIDTH] IN BLOOD BY AUTOMATED COUNT: 12.2 % (ref 11.5–17)
FLUAV AG UPPER RESP QL IA.RAPID: NOT DETECTED
FLUBV AG UPPER RESP QL IA.RAPID: NOT DETECTED
GFR SERPLBLD CREATININE-BSD FMLA CKD-EPI: >60 MLS/MIN/1.73/M2
GLOBULIN SER-MCNC: 3.2 GM/DL (ref 2.4–3.5)
GLUCOSE SERPL-MCNC: 121 MG/DL (ref 74–100)
HCT VFR BLD AUTO: 40.9 % (ref 37–47)
HGB BLD-MCNC: 13.8 G/DL (ref 12–16)
IMM GRANULOCYTES # BLD AUTO: 0.01 X10(3)/MCL (ref 0–0.04)
IMM GRANULOCYTES NFR BLD AUTO: 0.2 %
LYMPHOCYTES # BLD AUTO: 1.28 X10(3)/MCL (ref 0.6–4.6)
LYMPHOCYTES NFR BLD AUTO: 28.8 %
MCH RBC QN AUTO: 28.3 PG (ref 27–31)
MCHC RBC AUTO-ENTMCNC: 33.7 G/DL (ref 33–36)
MCV RBC AUTO: 84 FL (ref 80–94)
MONOCYTES # BLD AUTO: 0.53 X10(3)/MCL (ref 0.1–1.3)
MONOCYTES NFR BLD AUTO: 11.9 %
NEUTROPHILS # BLD AUTO: 2.52 X10(3)/MCL (ref 2.1–9.2)
NEUTROPHILS NFR BLD AUTO: 56.8 %
NRBC BLD AUTO-RTO: 0 %
PLATELET # BLD AUTO: 219 X10(3)/MCL (ref 130–400)
PMV BLD AUTO: 8.8 FL (ref 7.4–10.4)
POTASSIUM SERPL-SCNC: 3.8 MMOL/L (ref 3.5–5.1)
PROT SERPL-MCNC: 7.5 GM/DL (ref 6.4–8.3)
RBC # BLD AUTO: 4.87 X10(6)/MCL (ref 4.2–5.4)
SARS-COV-2 RNA RESP QL NAA+PROBE: NOT DETECTED
SODIUM SERPL-SCNC: 137 MMOL/L (ref 136–145)
TROPONIN I SERPL-MCNC: <0.01 NG/ML (ref 0–0.04)
WBC # SPEC AUTO: 4.44 X10(3)/MCL (ref 4.5–11.5)

## 2023-05-17 PROCEDURE — 85025 COMPLETE CBC W/AUTO DIFF WBC: CPT | Performed by: EMERGENCY MEDICINE

## 2023-05-17 PROCEDURE — 99285 EMERGENCY DEPT VISIT HI MDM: CPT | Mod: 25

## 2023-05-17 PROCEDURE — 93005 ELECTROCARDIOGRAM TRACING: CPT

## 2023-05-17 PROCEDURE — 84484 ASSAY OF TROPONIN QUANT: CPT | Performed by: EMERGENCY MEDICINE

## 2023-05-17 PROCEDURE — 0240U COVID/FLU A&B PCR: CPT | Performed by: EMERGENCY MEDICINE

## 2023-05-17 PROCEDURE — 80053 COMPREHEN METABOLIC PANEL: CPT | Performed by: EMERGENCY MEDICINE

## 2023-05-17 RX ORDER — AZITHROMYCIN 250 MG/1
TABLET, FILM COATED ORAL
Qty: 6 TABLET | Refills: 0 | Status: SHIPPED | OUTPATIENT
Start: 2023-05-17 | End: 2023-05-22

## 2023-05-17 RX ORDER — PROMETHAZINE HYDROCHLORIDE AND DEXTROMETHORPHAN HYDROBROMIDE 6.25; 15 MG/5ML; MG/5ML
5 SYRUP ORAL EVERY 4 HOURS PRN
Qty: 180 ML | Refills: 0 | Status: SHIPPED | OUTPATIENT
Start: 2023-05-17 | End: 2023-05-27

## 2023-05-17 NOTE — Clinical Note
"Sonia Soode" Montrell was seen and treated in our emergency department on 5/17/2023.  She may return to work on 05/19/2023.       If you have any questions or concerns, please don't hesitate to call.      Neha Tuttle MD"

## 2023-05-17 NOTE — ED PROVIDER NOTES
Encounter Date: 5/17/2023       History     Chief Complaint   Patient presents with    Cough     Pt c/o cough, chills, chestpain onset two days ago.     37-year-old female complains of cough, congestion, chest pain, generalized weakness for the last 2 days.  She states she had the same symptoms last week and saw her PCP who thought she had anxiety.  She has no fever, nausea, vomiting, sore throat, abdominal pain.  She is currently on her menstrual cycle and has had a bilateral tubal ligation.  The chest pain is constant pressure, worse when she lies on her side.  She has no history of PE, DVT, recent surgery.      Review of patient's allergies indicates:   Allergen Reactions    Aspirin Hives     Past Medical History:   Diagnosis Date    Hypertension     Obesity      Past Surgical History:   Procedure Laterality Date    BILATERAL TUBAL LIGATION      CHOLECYSTECTOMY      DILATION AND CURETTAGE OF UTERUS      TUBAL LIGATION       No family history on file.  Social History     Tobacco Use    Smoking status: Never    Smokeless tobacco: Never     Review of Systems   Constitutional:  Positive for chills.   Respiratory:  Positive for cough.    Cardiovascular:  Positive for chest pain.   All other systems reviewed and are negative.    Physical Exam     Initial Vitals [05/17/23 0926]   BP Pulse Resp Temp SpO2   (!) 133/95 85 20 97 °F (36.1 °C) 97 %      MAP       --         Physical Exam    Nursing note and vitals reviewed.  Constitutional: She appears well-developed and well-nourished. She is not diaphoretic. No distress.   HENT:   Head: Normocephalic and atraumatic.   Mouth/Throat: Oropharynx is clear and moist.   Eyes: Conjunctivae are normal. Pupils are equal, round, and reactive to light.   Neck: Neck supple.   Cardiovascular:  Normal rate, regular rhythm, normal heart sounds and intact distal pulses.           Pulmonary/Chest: Breath sounds normal. No respiratory distress. She has no wheezes. She has no rhonchi. She has  no rales.   Abdominal: Abdomen is soft. Bowel sounds are normal. She exhibits no distension. There is no abdominal tenderness. There is no guarding.   Musculoskeletal:         General: No tenderness or edema. Normal range of motion.      Cervical back: Neck supple.     Neurological: She is alert and oriented to person, place, and time.   Skin: Skin is warm and dry. Capillary refill takes less than 2 seconds. No rash noted.   Psychiatric: She has a normal mood and affect. Thought content normal.       ED Course   Procedures  Labs Reviewed   CBC WITH DIFFERENTIAL - Abnormal; Notable for the following components:       Result Value    WBC 4.44 (*)     All other components within normal limits   COMPREHENSIVE METABOLIC PANEL - Abnormal; Notable for the following components:    Glucose Level 121 (*)     Aspartate Aminotransferase 36 (*)     All other components within normal limits   COVID/FLU A&B PCR - Normal    Narrative:     The Xpert Xpress SARS-CoV-2/FLU/RSV plus is a rapid, multiplexed real-time PCR test intended for the simultaneous qualitative detection and differentiation of SARS-CoV-2, Influenza A, Influenza B, and respiratory syncytial virus (RSV) viral RNA in either nasopharyngeal swab or nasal swab specimens.         TROPONIN I - Normal     EKG Readings: (Independently Interpreted)   Initial Reading: No STEMI. Rhythm: Normal Sinus Rhythm. Heart Rate: 76. Ectopy: No Ectopy. ST Segments: Normal ST Segments. T Waves: Normal. Clinical Impression: Normal Sinus Rhythm   ECG Results              EKG 12-lead (Final result)  Result time 05/18/23 09:22:24      Final result by Interface, Lab In [facility] (05/18/23 09:22:24)                   Narrative:    Test Reason : R07.9,    Vent. Rate : 076 BPM     Atrial Rate : 076 BPM     P-R Int : 178 ms          QRS Dur : 084 ms      QT Int : 384 ms       P-R-T Axes : 023 022 012 degrees     QTc Int : 432 ms    Normal sinus rhythm  Normal ECG  When compared with ECG of  09-JAN-2023 13:50,  No significant change was found  Confirmed by Ran Hayes MD ([id number]) on 5/18/2023 9:22:13 AM    Referred By: SUMAYA   SELF           Confirmed By:Ran Hayes MD                                  Imaging Results              X-Ray Chest PA And Lateral (Final result)  Result time 05/17/23 11:11:05      Final result by Erlinda Glover MD (05/17/23 11:11:05)                   Impression:      No acute cardiopulmonary abnormality.      Electronically signed by: Erlinda Glover  Date:    05/17/2023  Time:    11:11               Narrative:    EXAMINATION:  XR CHEST PA AND LATERAL    CLINICAL HISTORY:  chest pain;    TECHNIQUE:  Two views of the chest    COMPARISON:  01/09/2023    FINDINGS:  LINES AND TUBES: None    MEDIASTINUM AND VANE: The cardiac silhouette is normal.    LUNGS: No lobar consolidation. No edema.    PLEURA:No pleural effusion. No pneumothorax.    BONES: No acute osseous abnormality.                                       Medications - No data to display  Medical Decision Making:   Initial Assessment:   37-year-old female complains of cough, congestion, chest pain, generalized weakness for the last 2 days.  She states she had the same symptoms last week and saw her PCP who thought she had anxiety.  She has no fever, nausea, vomiting, sore throat, abdominal pain.  She is currently on her menstrual cycle and has had a bilateral tubal ligation.  The chest pain is constant pressure, worse when she lies on her side.  She has no history of PE, DVT, recent surgery.      Differential Diagnosis:   Differential diagnosis includes but is not limited to cough, bronchitis, pneumonia, pharyngitis, ACS  Clinical Tests:   Lab Tests: Reviewed  Radiological Study: Reviewed  ED Management:  Patient was seen and evaluated in the emergency department with history, physical exam, EKG, chest x-ray, swabs.  Her workup in the emergency room is benign.  I am prescribing antibiotics due to her chest  pain and sputum production in her concern that she has a pneumonia although I do not see pneumonia on her x-ray.  She should follow-up with her PCP for further care.                        Clinical Impression:   Final diagnoses:  [R07.9] Chest pain  [J20.9] Acute bronchitis, unspecified organism (Primary)  [R07.9] Nonspecific chest pain        ED Disposition Condition    Discharge Stable          ED Prescriptions       Medication Sig Dispense Start Date End Date Auth. Provider    promethazine-dextromethorphan (PROMETHAZINE-DM) 6.25-15 mg/5 mL Syrp Take 5 mLs by mouth every 4 (four) hours as needed (cough). 180 mL 5/17/2023 5/27/2023 Neha Tuttle MD    azithromycin (Z-MAGALIS) 250 MG tablet Take 2 tablets by mouth on day 1; Take 1 tablet by mouth on days 2-5 6 tablet 5/17/2023 5/22/2023 Neha Tuttle MD          Follow-up Information       Follow up With Specialties Details Why Contact Info    [facility] Medicine Schedule an appointment as soon as possible for a visit   [address] [place] [id number] [phone]               Neha Tuttle MD  05/19/23 0972

## 2023-05-19 ENCOUNTER — HOSPITAL ENCOUNTER (EMERGENCY)
Facility: HOSPITAL | Age: 38
Discharge: HOME | End: 2023-05-19
Attending: INTERNAL MEDICINE
Payer: MEDICAID

## 2023-05-19 VITALS
OXYGEN SATURATION: 97 % | HEIGHT: 66 IN | WEIGHT: 260 LBS | BODY MASS INDEX: 41.78 KG/M2 | HEART RATE: 106 BPM | RESPIRATION RATE: 20 BRPM | DIASTOLIC BLOOD PRESSURE: 96 MMHG | SYSTOLIC BLOOD PRESSURE: 133 MMHG

## 2023-05-19 DIAGNOSIS — L50.9 URTICARIAL RASH: ICD-10-CM

## 2023-05-19 DIAGNOSIS — T50.905A ADVERSE EFFECT OF DRUG, INITIAL ENCOUNTER: Primary | ICD-10-CM

## 2023-05-19 PROCEDURE — 99284 EMERGENCY DEPT VISIT MOD MDM: CPT

## 2023-05-19 PROCEDURE — 63600175 PHARM REV CODE 636 W HCPCS: Performed by: PHYSICIAN ASSISTANT

## 2023-05-19 PROCEDURE — 96372 THER/PROPH/DIAG INJ SC/IM: CPT | Performed by: PHYSICIAN ASSISTANT

## 2023-05-19 PROCEDURE — 25000003 PHARM REV CODE 250: Performed by: PHYSICIAN ASSISTANT

## 2023-05-19 RX ORDER — DIPHENHYDRAMINE HYDROCHLORIDE 50 MG/ML
25 INJECTION INTRAMUSCULAR; INTRAVENOUS
Status: COMPLETED | OUTPATIENT
Start: 2023-05-19 | End: 2023-05-19

## 2023-05-19 RX ORDER — FAMOTIDINE 40 MG/1
40 TABLET, FILM COATED ORAL DAILY
Qty: 10 TABLET | Refills: 0 | Status: ON HOLD | OUTPATIENT
Start: 2023-05-19 | End: 2023-11-17

## 2023-05-19 RX ORDER — FAMOTIDINE 20 MG/1
40 TABLET, FILM COATED ORAL
Status: COMPLETED | OUTPATIENT
Start: 2023-05-19 | End: 2023-05-19

## 2023-05-19 RX ORDER — PREDNISONE 20 MG/1
20 TABLET ORAL DAILY
Qty: 3 TABLET | Refills: 0 | Status: SHIPPED | OUTPATIENT
Start: 2023-05-19 | End: 2023-05-22

## 2023-05-19 RX ORDER — METHYLPREDNISOLONE SOD SUCC 125 MG
125 VIAL (EA) INJECTION
Status: COMPLETED | OUTPATIENT
Start: 2023-05-19 | End: 2023-05-19

## 2023-05-19 RX ADMIN — FAMOTIDINE 40 MG: 20 TABLET, FILM COATED ORAL at 08:05

## 2023-05-19 RX ADMIN — DIPHENHYDRAMINE HYDROCHLORIDE 25 MG: 50 INJECTION, SOLUTION INTRAMUSCULAR; INTRAVENOUS at 08:05

## 2023-05-19 RX ADMIN — METHYLPREDNISOLONE SODIUM SUCCINATE 125 MG: 125 INJECTION, POWDER, FOR SOLUTION INTRAMUSCULAR; INTRAVENOUS at 08:05

## 2023-05-20 ENCOUNTER — HOSPITAL ENCOUNTER (EMERGENCY)
Facility: HOSPITAL | Age: 38
Discharge: HOME | End: 2023-05-20
Attending: INTERNAL MEDICINE
Payer: MEDICAID

## 2023-05-20 VITALS
OXYGEN SATURATION: 97 % | RESPIRATION RATE: 16 BRPM | HEART RATE: 82 BPM | DIASTOLIC BLOOD PRESSURE: 87 MMHG | SYSTOLIC BLOOD PRESSURE: 102 MMHG | TEMPERATURE: 98 F

## 2023-05-20 DIAGNOSIS — L29.9 PRURITUS: ICD-10-CM

## 2023-05-20 DIAGNOSIS — L50.9 URTICARIA: Primary | ICD-10-CM

## 2023-05-20 PROCEDURE — 99284 EMERGENCY DEPT VISIT MOD MDM: CPT

## 2023-05-20 RX ORDER — LORAZEPAM 2 MG/1
2 TABLET ORAL EVERY 8 HOURS PRN
Qty: 2 TABLET | Refills: 0 | Status: SHIPPED | OUTPATIENT
Start: 2023-05-20

## 2023-05-20 RX ORDER — TRIAMCINOLONE ACETONIDE 1 MG/G
OINTMENT TOPICAL 3 TIMES DAILY
Qty: 454 G | Refills: 0 | Status: SHIPPED | OUTPATIENT
Start: 2023-05-20

## 2023-05-20 NOTE — DISCHARGE INSTRUCTIONS
Do not take steroids until tomorrow.  Take famotidine during the day to help with symptom relief comment can also take Benadryl at nighttime.

## 2023-05-20 NOTE — ED PROVIDER NOTES
Source of History:  Patient, no limitations    Chief complaint:  Itching      HPI:  Sonia Stock is a 37 y.o. female presenting with Itching       Repeat visit for allergic rxn, received solu-medrol 125 mg x 2 doses, diphenhydramine 50 mg PO, diphenhydramine 25 mg IM, Pepcid 40 mg PO    Patient presents for evaluation of hives and itching. Onset of symptoms was several hours ago, with gradually improving since that time. Respiratory symptoms include  none . Patient denies chest pain, dyspnea on exertion, dyspnea while laying down, productive cough, shortness of breath, and wheezing. Patient reports similar previous allergic reactions. Patient has exposure to new medications or allergens. Care prior to arrival consisted of see above, with transient relief.        Review of Systems   Constitutional symptoms:  Negative except as documented in HPI.   Skin symptoms:  Negative except as documented in HPI.   HEENT symptoms:  Negative except as documented in HPI.   Respiratory symptoms:  Negative except as documented in HPI.   Cardiovascular symptoms:  Negative except as documented in HPI.   Gastrointestinal symptoms:  Negative except as documented in HPI.    Genitourinary symptoms:  Negative except as documented in HPI.   Musculoskeletal symptoms:  Negative except as documented in HPI.   Neurologic symptoms:  Negative except as documented in HPI.   Psychiatric symptoms:  Negative except as documented in HPI.   Allergy/immunologic symptoms:  Negative except as documented in HPI.             Additional review of systems information: All other systems reviewed and otherwise negative.      Review of patient's allergies indicates:   Allergen Reactions    Aspirin Hives    Azithromycin Rash       PMH:  As per HPI and below:    Past Medical History:   Diagnosis Date    Hypertension     Obesity         History reviewed. No pertinent family history.    Past Surgical History:   Procedure Laterality Date    BILATERAL  TUBAL LIGATION      CHOLECYSTECTOMY      DILATION AND CURETTAGE OF UTERUS      TUBAL LIGATION         Social History     Tobacco Use    Smoking status: Never    Smokeless tobacco: Never       There is no problem list on file for this patient.       Physical Exam:    /87 (Patient Position: Sitting)   Pulse 82   Temp 98.2 °F (36.8 °C) (Oral)   Resp 16   SpO2 97%     Nursing note and vital signs reviewed.    General:  Alert, no acute distress.   Skin: Normal for Ethnic Origin, No cyanosis, hives diffusely   HEENT: Normocephalic and atraumatic, Vision unchanged, Pupils symmetric, No icterus , Nasal mucosa is pink and moist  Cardiovascular:  Regular rate and rhythm, No edema  Chest Wall: No deformity, equal chest rise  Respiratory:  Lungs are clear to auscultation, respirations are non-labored.    Musculoskeletal:  No deformity, Normal perfusion to all extremities  Gastrointestinal:  Soft, Non distended  Neurological:  Alert and oriented, normal motor observed, normal speech observed.    Psychiatric:  Cooperative, appropriate mood & affect.        Labs that have been ordered have been independently reviewed and interpreted by myself.     Old Chart Reviewed.      Initial Impression/ Differential Dx:  Differential Diagnosis includes, but is not limited to:  Drug eruption, allergic reaction/urticatia, irritant/contact dermatitis, viral exanthem, local trauma/contusion, abrasion, erythema multiforme, Gonzales-Augie syndrome, toxic epidermal necrolysis, cellulitis, Staph scalded skin syndrome, toxic shock syndrome.       MDM:      Reviewed Nurses Note.    Reviewed Pertinent old records.    Orders Placed This Encounter    LORazepam (ATIVAN) 2 MG Tab    triamcinolone acetonide 0.1% (KENALOG) 0.1 % ointment                    Labs Reviewed - No data to display       No orders to display        No visits with results within 1 Day(s) from this visit.   Latest known visit with results is:   Admission on 05/17/2023,  Discharged on 05/17/2023   Component Date Value Ref Range Status    Influenza A PCR 05/17/2023 Not Detected  Not Detected Final    Influenza B PCR 05/17/2023 Not Detected  Not Detected Final    SARS-CoV-2 PCR 05/17/2023 Not Detected  Not Detected, Negative, Invalid Final    WBC 05/17/2023 4.44 (L)  4.50 - 11.50 x10(3)/mcL Final    RBC 05/17/2023 4.87  4.20 - 5.40 x10(6)/mcL Final    Hgb 05/17/2023 13.8  12.0 - 16.0 g/dL Final    Hct 05/17/2023 40.9  37.0 - 47.0 % Final    MCV 05/17/2023 84.0  80.0 - 94.0 fL Final    MCH 05/17/2023 28.3  27.0 - 31.0 pg Final    MCHC 05/17/2023 33.7  33.0 - 36.0 g/dL Final    RDW 05/17/2023 12.2  11.5 - 17.0 % Final    Platelet 05/17/2023 219  130 - 400 x10(3)/mcL Final    MPV 05/17/2023 8.8  7.4 - 10.4 fL Final    Neut % 05/17/2023 56.8  % Final    Lymph % 05/17/2023 28.8  % Final    Mono % 05/17/2023 11.9  % Final    Eos % 05/17/2023 1.4  % Final    Basophil % 05/17/2023 0.9  % Final    Lymph # 05/17/2023 1.28  0.6 - 4.6 x10(3)/mcL Final    Neut # 05/17/2023 2.52  2.1 - 9.2 x10(3)/mcL Final    Mono # 05/17/2023 0.53  0.1 - 1.3 x10(3)/mcL Final    Eos # 05/17/2023 0.06  0 - 0.9 x10(3)/mcL Final    Baso # 05/17/2023 0.04  <=0.2 x10(3)/mcL Final    IG# 05/17/2023 0.01  0 - 0.04 x10(3)/mcL Final    IG% 05/17/2023 0.2  % Final    NRBC% 05/17/2023 0.0  % Final    Sodium Level 05/17/2023 137  136 - 145 mmol/L Final    Potassium Level 05/17/2023 3.8  3.5 - 5.1 mmol/L Final    Chloride 05/17/2023 103  98 - 107 mmol/L Final    Carbon Dioxide 05/17/2023 25  22 - 29 mmol/L Final    Glucose Level 05/17/2023 121 (H)  74 - 100 mg/dL Final    Blood Urea Nitrogen 05/17/2023 10.2  7.0 - 18.7 mg/dL Final    Creatinine 05/17/2023 0.97  0.55 - 1.02 mg/dL Final    Calcium Level Total 05/17/2023 9.3  8.4 - 10.2 mg/dL Final    Protein Total 05/17/2023 7.5  6.4 - 8.3 gm/dL Final    Albumin Level 05/17/2023 4.3  3.5 - 5.0 g/dL Final    Globulin 05/17/2023 3.2  2.4 - 3.5 gm/dL Final    Albumin/Globulin Ratio  05/17/2023 1.3  1.1 - 2.0 ratio Final    Bilirubin Total 05/17/2023 0.3  <=1.5 mg/dL Final    Alkaline Phosphatase 05/17/2023 54  40 - 150 unit/L Final    Alanine Aminotransferase 05/17/2023 11  0 - 55 unit/L Final    Aspartate Aminotransferase 05/17/2023 36 (H)  5 - 34 unit/L Final    eGFR 05/17/2023 >60  mls/min/1.73/m2 Final    Troponin-I 05/17/2023 <0.010  0.000 - 0.045 ng/mL Final       Imaging Results    None                                              Diagnostic Impression:    1. Urticaria    2. Pruritus         ED Disposition Condition    Discharge Stable             Follow-up Information       [facility] - Emergency Dept.    Specialty: Emergency Medicine  Why: If symptoms worsen  Contact information:  [address]  [facility] [phone]                            ED Prescriptions       Medication Sig Dispense Start Date End Date Auth. Provider    LORazepam (ATIVAN) 2 MG Tab Take 1 tablet (2 mg total) by mouth every 8 (eight) hours as needed (restlessness). 2 tablet 5/20/2023 -- Bubba Walker,     triamcinolone acetonide 0.1% (KENALOG) 0.1 % ointment Apply topically 3 (three) times daily. 454 g 5/20/2023 -- Bubba Walker DO          Follow-up Information       Follow up With Specialties Details Why Contact Info    [facility] - Emergency Dept Emergency Medicine  If symptoms worsen [address]  [facility] [phone]             Bubba Walker DO  05/20/23 0419

## 2023-05-20 NOTE — ED PROVIDER NOTES
Encounter Date: 5/19/2023       History     Chief Complaint   Patient presents with    Allergic Reaction     Pt to er with generalized rash that started yesterday. Pt states that she recently started a z-darrion     37-year-old female presents to the ED with generalized rash and itching that began yesterday.  Patient states she started taking a Z-Darrion 2 days ago for coughing congestion prior to the onset of her rash.  Denies shortness breath, chest pain, nausea or vomiting.  Patient notes extremely anxious.  No medications taken at home.  Denies any other new lotions, detergents, soaps, etcetera.    The history is provided by the patient. No  was used.   Review of patient's allergies indicates:   Allergen Reactions    Aspirin Hives    Azithromycin Rash     Past Medical History:   Diagnosis Date    Hypertension     Obesity      Past Surgical History:   Procedure Laterality Date    BILATERAL TUBAL LIGATION      CHOLECYSTECTOMY      DILATION AND CURETTAGE OF UTERUS      TUBAL LIGATION       History reviewed. No pertinent family history.  Social History     Tobacco Use    Smoking status: Never    Smokeless tobacco: Never     Review of Systems   Constitutional:  Negative for chills and fever.   Eyes:  Negative for visual disturbance.   Respiratory:  Negative for cough and shortness of breath.    Cardiovascular:  Negative for chest pain.   Gastrointestinal:  Negative for abdominal pain, nausea and vomiting.   Genitourinary:  Negative for dysuria.   Musculoskeletal:  Negative for arthralgias.   Skin:  Positive for rash. Negative for color change.   Neurological:  Negative for dizziness and headaches.   Psychiatric/Behavioral:  Negative for behavioral problems.    All other systems reviewed and are negative.    Physical Exam     Initial Vitals [05/19/23 1959]   BP Pulse Resp Temp SpO2   (!) 133/96 (!) 120 20 -- 97 %      MAP       --         Physical Exam    Nursing note and vitals reviewed.  Constitutional:  She appears well-developed and well-nourished.   HENT:   Head: Normocephalic and atraumatic.   Mouth/Throat: Uvula is midline, oropharynx is clear and moist and mucous membranes are normal. No trismus in the jaw. No uvula swelling.   Eyes: EOM are normal. Pupils are equal, round, and reactive to light.   Neck: Neck supple.   Cardiovascular:  Normal rate, regular rhythm and normal heart sounds.           Pulmonary/Chest: Breath sounds normal. No respiratory distress. She has no wheezes. She has no rhonchi. She has no rales.   Musculoskeletal:         General: Normal range of motion.      Cervical back: Neck supple.     Neurological: She is alert and oriented to person, place, and time. She has normal strength. GCS score is 15. GCS eye subscore is 4. GCS verbal subscore is 5. GCS motor subscore is 6.   Skin: Skin is warm and dry. Rash noted. Rash is urticarial (diffuse, noted to chest, back, BUE).   Psychiatric: She has a normal mood and affect.       ED Course   Procedures  Labs Reviewed - No data to display       Imaging Results    None          Medications   methylPREDNISolone sodium succinate injection 125 mg (125 mg Intramuscular Given 5/19/23 2010)   famotidine tablet 40 mg (40 mg Oral Given 5/19/23 2010)   diphenhydrAMINE injection 25 mg (25 mg Intramuscular Given 5/19/23 2015)     Medical Decision Making:   Initial Assessment:   37-year-old female presents to the ED with generalized rash and itching that began yesterday.  Patient states she started taking a Z-Darrion 2 days ago for coughing congestion prior to the onset of her rash.  Denies shortness breath, chest pain, nausea or vomiting.  Patient notes extremely anxious.  No medications taken at home.  Denies any other new lotions, detergents, soaps, etcetera.  Differential Diagnosis:   Allergic reaction, drug reaction, rash           ED Course as of 05/19/23 2100   Fri May 19, 2023   2058 Patient states she was feeling much better.  States she is still itching  a little bit but has improved significantly.  Will discharge home with a short course of steroids, famotidine, and instructions to take Benadryl at nighttime.  Azithromycin was added to patient's allergy list. [MA]      ED Course User Index  [MA] Jonel Mckoen PA-C                 Clinical Impression:   Final diagnoses:  [T50.892Q] Adverse effect of drug, initial encounter (Primary)  [L50.9] Urticarial rash        ED Disposition Condition    Discharge Stable          ED Prescriptions       Medication Sig Dispense Start Date End Date Auth. Provider    predniSONE (DELTASONE) 20 MG tablet Take 1 tablet (20 mg total) by mouth once daily. for 3 days 3 tablet 5/19/2023 5/22/2023 Jonel Mckeon PA-C    famotidine (PEPCID) 40 MG tablet Take 1 tablet (40 mg total) by mouth once daily. for 10 days 10 tablet 5/19/2023 5/29/2023 Jonel Mckeon PA-C          Follow-up Information       Follow up With Specialties Details Why Contact Info    [facility]   [address] [place] [id number] [phone]      [facility] - Emergency Dept Emergency Medicine In 1 week If symptoms worsen [id number]  [facility] [id number] [phone]             Jonel Mckeon PA-C  05/19/23 2100

## 2023-06-06 ENCOUNTER — CLINICAL SUPPORT (OUTPATIENT)
Dept: BARIATRICS | Facility: HOSPITAL | Age: 38
End: 2023-06-06

## 2023-06-06 VITALS — WEIGHT: 289.31 LBS | HEIGHT: 66 IN | BODY MASS INDEX: 46.5 KG/M2

## 2023-06-06 PROCEDURE — 94200034 HC BARIATRIC NUTRITIONAL SVCS PT GRADE I: Performed by: DIETITIAN, REGISTERED

## 2023-06-06 NOTE — PROGRESS NOTES
Patient Education [x] MSWL Visit Number: 1/3     []  Pre-op Wt Loss: Goal (as ordered on referral):   [] NSWL Visit     Current Wt:   Wt Readings from Last 3 Encounters:   06/06/23 1506 131.2 kg (289 lb 4.8 oz)   05/19/23 1959 117.9 kg (260 lb)   05/17/23 0926 132.5 kg (292 lb)      Current BMI:   BMI Readings from Last 1 Encounters:   06/06/23 46.69 kg/m²                                                                         Barriers to learning:  []None evident  [x]Acuity of illness  []Cognitive defects  []Cultural barriers  []Desire/Motivation  []Difficulty concentrating  []Emotional state  []Financial concerns  []Hearing deficit  []Language barrier  []Literacy  []Memory problems  []Vision impairment     Home caregiver present for session   []YES  [x] NO       Teaching methods:  []Demonstration  [x]Explanation  [x]Printed materials  []Teach back  []Virtual/web based         Verbalizes understanding Demonstrates  Needs further teaching Needs practice/ supervision Comments    Bariatric Surgery Diet  [] [] [] []    Clear liquid [] [] [] []    Full liquid [] [] [] []    Weight Reduction [x] [] [] []    Other Diet [] [] [] []          Additional Learner (s) Present                                                                  []Spouse   []Daughter    []  Son  []Family member   []Friend   []Grandfather   []Grandmother   []Father   []Mother   []Other       Expected Compliance:  [x]Good  []Fair  []Poor    Additional Information:      Last month, have had bronchitis and allergic reaction to z pack; has been in and out of ER. Primary care provider put her on liquid only diet for the past week; random throat tightening. On antibiotic and steroid. Follow up with doctor in 1-2 weeks. Lost 3 lbs. Tried banana and tolerated it okay; is going to start trying more foods as tolerated (under guidance of PCP); gave info on soft/pureed foods.     24 hr recall:  Popsicles, soup, water, milk, protein shakes (2 a day-fairlife 30  gram shakes) 60 grams from shakes;     Plan:  Work on Bariatric Nutrition Checklist.  Slow eating pace by putting utensil down between bites., Chew to applesauce consistency., Cut food into thumbnail sized bites.  Focus on food tolerance and meeting 70 g protein goal  Record 1 week of food and beverage amount and GI symptoms.

## 2023-07-11 ENCOUNTER — CLINICAL SUPPORT (OUTPATIENT)
Dept: BARIATRICS | Facility: HOSPITAL | Age: 38
End: 2023-07-11

## 2023-07-11 VITALS — WEIGHT: 293 LBS | BODY MASS INDEX: 48.82 KG/M2 | HEIGHT: 65 IN

## 2023-07-11 PROCEDURE — 94200034 HC BARIATRIC NUTRITIONAL SVCS PT GRADE I: Performed by: DIETITIAN, REGISTERED

## 2023-07-11 NOTE — PROGRESS NOTES
Patient Education [] MSWL Visit Number: 2/3     []  Pre-op Wt Loss: Goal (as ordered on referral):   [] NSWL Visit     Current Wt:   Wt Readings from Last 3 Encounters:   23 1547 133.3 kg (293 lb 14.4 oz)   23 1506 131.2 kg (289 lb 4.8 oz)   23 1959 117.9 kg (260 lb)      Current BMI:   BMI Readings from Last 1 Encounters:   23 48.91 kg/m²                                                                         Barriers to learning:  [x]None evident  []Acuity of illness  []Cognitive defects  []Cultural barriers  []Desire/Motivation  []Difficulty concentrating  []Emotional state  []Financial concerns  []Hearing deficit  []Language barrier  []Literacy  []Memory problems  []Vision impairment     Home caregiver present for session   []YES  [x] NO       Teaching methods:  []Demonstration  [x]Explanation  [x]Printed materials  []Teach back  []Virtual/web based         Verbalizes understanding Demonstrates  Needs further teaching Needs practice/ supervision Comments    Bariatric Surgery Diet  [] [] [] []    Clear liquid [] [] [] []    Full liquid [] [] [] []    Weight Reduction [x] [] [] []    Other Diet [] [] [] []          Additional Learner (s) Present                                                                  []Spouse   []Daughter    []  Son  []Family member   []Friend   []Grandfather   []Grandmother   []Father   []Mother   []Other       Expected Compliance:  [x]Good  []Fair  []Poor    Additional Information:      Advance to regular texture diet; no additional symptoms. Has been of fluid pills and blood pressure pill for past week. Suspect weight gain 2/2 to fluid overload. Discussed low sodium protein choices.     24 hr recall:  5 am: wakeup  Mornin:30 am: loaded Eggs and sausages  Afternoon: 2 pm: brought from home: chicken, lettuce, ranch   Dinner: fruit    At home Last Monday was last day off:  B: cereal and unsweet silk almond milk  L: 30 g fairlife protein shake  D: Vegetable and  meat (makes rice for kids)    Plan:  Resume work-up for surgery.  Continue to review Bariatric Nutrition Guidebook at home and call with any questions.  Follow modified pre op meal plans: 3 meals, 1-2 snacks. 4 ounce protein at each meals.  Choose low sodium foods  Eat within 1-2 hours of waking up

## 2023-08-31 ENCOUNTER — CLINICAL SUPPORT (OUTPATIENT)
Dept: BARIATRICS | Facility: HOSPITAL | Age: 38
End: 2023-08-31

## 2023-08-31 VITALS — WEIGHT: 291 LBS | BODY MASS INDEX: 48.42 KG/M2

## 2023-08-31 DIAGNOSIS — E66.9 OBESITY, UNSPECIFIED CLASSIFICATION, UNSPECIFIED OBESITY TYPE, UNSPECIFIED WHETHER SERIOUS COMORBIDITY PRESENT: Primary | ICD-10-CM

## 2023-08-31 PROCEDURE — 94200034 HC BARIATRIC NUTRITIONAL SVCS PT GRADE I

## 2023-08-31 NOTE — PROGRESS NOTES
Patient Education [x] MSWL Visit Number:  3/3 RUBI     []  Pre-op Wt Loss Goal (as ordered on referral):   [] NSWL Visit:     Current Wt:  291#  Current BMI: 48.42                                                                      Barriers to learning:  [x]None evident  []Acuity of illness  []Cognitive defects  []Cultural barriers  []Desire/Motivation  []Difficulty concentrating  []Emotional state  []Financial concerns  []Hearing deficit  []Language barrier  []Literacy  []Memory problems  []Vision impairment     Home caregiver present for session   []YES  [x] NO       Teaching methods:  []Demonstration  [x]Explanation  [x]Printed materials  []Teach back  []Virtual/web based         Verbalizes understanding Demonstrates  Needs further teaching Needs practice/ supervision Comments    Bariatric Surgery Diet  [] [] [] []    Clear liquid [] [] [] []    Full liquid [] [] [] []    Weight Reduction [x] [] [] []    Other Diet [] [] [] []          Additional Learner (s) Present                                                                  []Spouse   []Daughter    []  Son  []Family member   []Friend   []Grandfather   []Grandmother   []Father   []Mother   []Other       Expected Compliance:  [x]Good  []Fair  []Poor    Additional Information:     Pt with 2lb wt loss, she is working on eat 3 meals per day. She has cut out all soda intake and increased water. Dicussed with pt why it is important to eat 3 meals per day and establish that pattern now. Gave her the mod pre op diet to follow until surgery and then a snack list to help her with healthier grab and go snacks       24 hr recall:  Banana   Salad   Meat and vegetables   Snacks on candy sometimes       Plan:   1. Follow mod pre po diet until surgery   Come for monthly weight checks

## 2023-09-21 ENCOUNTER — TELEPHONE (OUTPATIENT)
Dept: BARIATRICS | Facility: HOSPITAL | Age: 38
End: 2023-09-21
Payer: MEDICAID

## 2023-09-21 NOTE — TELEPHONE ENCOUNTER
----- Message from Nikkie Ellison sent at 9/8/2023 11:31 AM CDT -----  Regarding: pt requirements  Called patient about her requirements, pt could not accept call at this time.

## 2023-09-28 ENCOUNTER — CLINICAL SUPPORT (OUTPATIENT)
Dept: BARIATRICS | Facility: HOSPITAL | Age: 38
End: 2023-09-28

## 2023-09-28 VITALS — BODY MASS INDEX: 49.14 KG/M2 | WEIGHT: 293 LBS

## 2023-09-28 DIAGNOSIS — E66.9 OBESITY, UNSPECIFIED CLASSIFICATION, UNSPECIFIED OBESITY TYPE, UNSPECIFIED WHETHER SERIOUS COMORBIDITY PRESENT: Primary | ICD-10-CM

## 2023-09-28 NOTE — PROGRESS NOTES
Monthly weight check   Current wt: 295.3#     Pt weight increased and she will come back in next month for a visit

## 2023-11-01 DIAGNOSIS — K21.9 GASTROESOPHAGEAL REFLUX DISEASE, UNSPECIFIED WHETHER ESOPHAGITIS PRESENT: Primary | ICD-10-CM

## 2023-11-07 ENCOUNTER — CLINICAL SUPPORT (OUTPATIENT)
Dept: BARIATRICS | Facility: HOSPITAL | Age: 38
End: 2023-11-07

## 2023-11-07 VITALS — WEIGHT: 293 LBS | HEIGHT: 65 IN | BODY MASS INDEX: 48.82 KG/M2

## 2023-11-07 DIAGNOSIS — E66.01 MORBID OBESITY WITH BMI OF 45.0-49.9, ADULT: Primary | ICD-10-CM

## 2023-11-07 PROCEDURE — 94200034 HC BARIATRIC NUTRITIONAL SVCS PT GRADE I: Performed by: DIETITIAN, REGISTERED

## 2023-11-07 NOTE — PROGRESS NOTES
"Patient Education [x] MSWL Visit Number:      []  Pre-op Wt Loss Goal (as ordered on referral):   [] [facility] Visit:     Height:   Ht Readings from Last 1 Encounters:   11/07/23 5' 5" (1.651 m)      Weight:   Wt Readings from Last 3 Encounters:   11/07/23 1504 133.8 kg (294 lb 14.4 oz)   09/28/23 1330 133.9 kg (295 lb 4.8 oz)   08/31/23 1004 132 kg (291 lb)      BMI:   BMI Readings from Last 1 Encounters:   11/07/23 49.07 kg/m²                                                                          Barriers to learning:  [x]None evident  []Acuity of illness  []Cognitive defects  []Cultural barriers  []Desire/Motivation  []Difficulty concentrating  []Emotional state  []Financial concerns  []Hearing deficit  []Language barrier  []Literacy  []Memory problems  []Vision impairment     Home caregiver present for session   []YES  [x] NO       Teaching methods:  []Demonstration  [x]Explanation  []Printed materials  []Teach back  []Virtual/web based         Verbalizes understanding Demonstrates  Needs further teaching Needs practice/ supervision Comments    Bariatric Surgery Diet  [] [] [] []    Clear liquid [] [] [] []    Full liquid [] [] [] []    Weight Reduction [x] [] [] []    Other Diet [] [] [] []          Additional Learner (s) Present                                                                  []Spouse   []Daughter    []  Son  []Family member   []Friend   []Grandfather   []Grandmother   []Father   []Mother   []Other       Expected Compliance:  [x]Good  []Fair  []Poor    Additional Information:    Pt back in office due to 4# increase in wt during monthly weigh in. Pt states her fast food intake has increased due to busyness of life. Pt also states her eating schedule has been haphazard.      Plan:  3 set scheduled meals per day with a protein and either a fruit or vegetable source at each meal.  Reduce fast food intake and pre-plan meals for busy work and school days.  Wt loss before next monthly weigh in: 390# or " less.

## 2023-11-17 PROBLEM — E66.01 MORBID OBESITY WITH BMI OF 45.0-49.9, ADULT: Status: ACTIVE | Noted: 2023-11-17

## 2023-12-13 ENCOUNTER — CLINICAL SUPPORT (OUTPATIENT)
Dept: BARIATRICS | Facility: HOSPITAL | Age: 38
End: 2023-12-13

## 2023-12-13 VITALS — BODY MASS INDEX: 49.82 KG/M2 | WEIGHT: 293 LBS

## 2023-12-13 DIAGNOSIS — E66.9 OBESITY, UNSPECIFIED CLASSIFICATION, UNSPECIFIED OBESITY TYPE, UNSPECIFIED WHETHER SERIOUS COMORBIDITY PRESENT: Primary | ICD-10-CM

## 2024-01-03 ENCOUNTER — TELEPHONE (OUTPATIENT)
Dept: BARIATRICS | Facility: HOSPITAL | Age: 39
End: 2024-01-03
Payer: MEDICAID

## 2024-01-25 ENCOUNTER — CLINICAL SUPPORT (OUTPATIENT)
Dept: BARIATRICS | Facility: HOSPITAL | Age: 39
End: 2024-01-25

## 2024-01-25 VITALS — BODY MASS INDEX: 49.91 KG/M2 | WEIGHT: 293 LBS

## 2024-01-25 DIAGNOSIS — E66.9 OBESITY, UNSPECIFIED CLASSIFICATION, UNSPECIFIED OBESITY TYPE, UNSPECIFIED WHETHER SERIOUS COMORBIDITY PRESENT: Primary | ICD-10-CM

## 2024-01-25 PROCEDURE — 94200034 HC BARIATRIC NUTRITIONAL SVCS PT GRADE I

## 2024-01-25 NOTE — PROGRESS NOTES
"Patient Education [] [facility] Visit Number:      [x]  Pre-op Wt Loss Goal (as ordered on referral):   weight gain from monthly visit  [] [facility] Visit:     Height:   Ht Readings from Last 1 Encounters:   11/07/23 5' 5" (1.651 m)      Weight:   Wt Readings from Last 3 Encounters:   01/25/24 1413 136 kg (299 lb 14.4 oz)   12/13/23 1236 135.8 kg (299 lb 6.4 oz)   11/07/23 1504 133.8 kg (294 lb 14.4 oz)      BMI:   BMI Readings from Last 1 Encounters:   01/25/24 49.91 kg/m²                                                                          Barriers to learning:  []None evident  []Acuity of illness  []Cognitive defects  []Cultural barriers  []Desire/Motivation  []Difficulty concentrating  []Emotional state  []Financial concerns  []Hearing deficit  []Language barrier  []Literacy  []Memory problems  []Vision impairment     Home caregiver present for session   []YES  [] NO       Teaching methods:  []Demonstration  []Explanation  []Printed materials  []Teach back  []Virtual/web based         Verbalizes understanding Demonstrates  Needs further teaching Needs practice/ supervision Comments    Bariatric Surgery Diet  [] [] [] []    Clear liquid [] [] [] []    Full liquid [] [] [] []    Weight Reduction [] [] [] []    Other Diet [] [] [] []          Additional Learner (s) Present                                                                  []Spouse   []Daughter    []  Son  []Family member   []Friend   []Grandfather   []Grandmother   []Father   []Mother   []Other       Expected Compliance:  []Good  []Fair  []Poor    Additional Information:    Pt weigh tis stable since her visit in December. She is still eating fast food an high starch food items and drinking soda. Dicussed how important it is to not skip meals and to reduce starchy foods. Gave her snack list, mod pre op diet for portions for her to follow. Talked about exercise and increasing that to 3x per week. She is doing an 8  minute cardio workout 2x pre week.       24 " hr recall:  5am: skips  sometimes she eats oatmeal   L: [employer]- fast food, she does not bring a lot   D: bobby     Plan:  Meal prep and plan   Limit starches, fast food and soda intake   Increase exercise 3x per week

## 2024-02-22 ENCOUNTER — CLINICAL SUPPORT (OUTPATIENT)
Dept: BARIATRICS | Facility: HOSPITAL | Age: 39
End: 2024-02-22

## 2024-02-22 VITALS — WEIGHT: 291.69 LBS | BODY MASS INDEX: 48.54 KG/M2

## 2024-02-22 DIAGNOSIS — E66.9 OBESITY, UNSPECIFIED CLASSIFICATION, UNSPECIFIED OBESITY TYPE, UNSPECIFIED WHETHER SERIOUS COMORBIDITY PRESENT: Primary | ICD-10-CM

## 2024-02-29 ENCOUNTER — CLINICAL SUPPORT (OUTPATIENT)
Dept: RESPIRATORY THERAPY | Facility: HOSPITAL | Age: 39
End: 2024-02-29
Attending: NURSE PRACTITIONER
Payer: MEDICAID

## 2024-02-29 ENCOUNTER — HOSPITAL ENCOUNTER (OUTPATIENT)
Dept: RADIOLOGY | Facility: HOSPITAL | Age: 39
Discharge: HOME | End: 2024-02-29
Attending: NURSE PRACTITIONER
Payer: MEDICAID

## 2024-02-29 DIAGNOSIS — Z01.818 PRE-OPERATIVE CLEARANCE: Primary | ICD-10-CM

## 2024-02-29 DIAGNOSIS — Z01.818 PREOPERATIVE CLEARANCE: Primary | ICD-10-CM

## 2024-02-29 DIAGNOSIS — Z01.818 PRE-OPERATIVE CLEARANCE: ICD-10-CM

## 2024-02-29 DIAGNOSIS — Z01.818 PREOPERATIVE CLEARANCE: ICD-10-CM

## 2024-02-29 PROCEDURE — 71046 X-RAY EXAM CHEST 2 VIEWS: CPT | Mod: TC

## 2024-02-29 PROCEDURE — 93005 ELECTROCARDIOGRAM TRACING: CPT

## 2024-02-29 PROCEDURE — 93010 ELECTROCARDIOGRAM REPORT: CPT | Mod: ,,, | Performed by: INTERNAL MEDICINE

## 2024-03-02 LAB
OHS QRS DURATION: 78 MS
OHS QTC CALCULATION: 440 MS

## 2024-03-14 ENCOUNTER — CLINICAL SUPPORT (OUTPATIENT)
Dept: BARIATRICS | Facility: HOSPITAL | Age: 39
End: 2024-03-14

## 2024-03-14 DIAGNOSIS — E66.9 OBESITY, UNSPECIFIED CLASSIFICATION, UNSPECIFIED OBESITY TYPE, UNSPECIFIED WHETHER SERIOUS COMORBIDITY PRESENT: Primary | ICD-10-CM

## 2024-03-15 VITALS — BODY MASS INDEX: 48.71 KG/M2 | WEIGHT: 292.69 LBS

## 2024-04-25 ENCOUNTER — TELEPHONE (OUTPATIENT)
Dept: SURGERY | Facility: CLINIC | Age: 39
End: 2024-04-25
Payer: MEDICAID

## 2024-04-26 ENCOUNTER — CLINICAL SUPPORT (OUTPATIENT)
Dept: BARIATRICS | Facility: HOSPITAL | Age: 39
End: 2024-04-26

## 2024-04-26 VITALS — WEIGHT: 293 LBS | BODY MASS INDEX: 49.41 KG/M2

## 2024-04-26 DIAGNOSIS — E66.01 MORBID OBESITY WITH BMI OF 45.0-49.9, ADULT: Primary | ICD-10-CM

## 2024-05-30 ENCOUNTER — CLINICAL SUPPORT (OUTPATIENT)
Dept: BARIATRICS | Facility: HOSPITAL | Age: 39
End: 2024-05-30

## 2024-05-30 DIAGNOSIS — E66.01 MORBID OBESITY WITH BMI OF 45.0-49.9, ADULT: Primary | ICD-10-CM

## 2024-06-03 VITALS — BODY MASS INDEX: 48.76 KG/M2 | WEIGHT: 293 LBS

## 2024-06-20 ENCOUNTER — CLINICAL SUPPORT (OUTPATIENT)
Dept: BARIATRICS | Facility: HOSPITAL | Age: 39
End: 2024-06-20

## 2024-06-20 VITALS — BODY MASS INDEX: 48.79 KG/M2 | WEIGHT: 293 LBS

## 2024-06-20 DIAGNOSIS — E66.01 MORBID OBESITY WITH BMI OF 45.0-49.9, ADULT: Primary | ICD-10-CM

## 2024-07-31 ENCOUNTER — CLINICAL SUPPORT (OUTPATIENT)
Dept: BARIATRICS | Facility: HOSPITAL | Age: 39
End: 2024-07-31

## 2024-07-31 VITALS — BODY MASS INDEX: 47.48 KG/M2 | WEIGHT: 285.31 LBS

## 2024-07-31 DIAGNOSIS — E66.01 MORBID OBESITY WITH BMI OF 45.0-49.9, ADULT: Primary | ICD-10-CM

## 2024-08-04 ENCOUNTER — PATIENT MESSAGE (OUTPATIENT)
Dept: ADMINISTRATIVE | Facility: OTHER | Age: 39
End: 2024-08-04
Payer: MEDICAID

## 2024-08-12 ENCOUNTER — LAB VISIT (OUTPATIENT)
Dept: LAB | Facility: HOSPITAL | Age: 39
End: 2024-08-12
Attending: SURGERY
Payer: MEDICAID

## 2024-08-12 DIAGNOSIS — K27.9 PEPTIC ULCER DISEASE: ICD-10-CM

## 2024-08-12 DIAGNOSIS — K56.609 PEPTIC ULCER WITHOUT HEMORRHAGE OR PERFORATION BUT WITH OBSTRUCTION: Primary | ICD-10-CM

## 2024-08-12 DIAGNOSIS — K27.9 PEPTIC ULCER WITHOUT HEMORRHAGE OR PERFORATION BUT WITH OBSTRUCTION: Primary | ICD-10-CM

## 2024-08-12 PROCEDURE — 83013 H PYLORI (C-13) BREATH: CPT

## 2024-08-13 LAB — UREA BREATH TEST QL: NEGATIVE

## 2024-08-20 ENCOUNTER — CLINICAL SUPPORT (OUTPATIENT)
Dept: BARIATRICS | Facility: HOSPITAL | Age: 39
End: 2024-08-20

## 2024-08-20 VITALS — HEIGHT: 65 IN | BODY MASS INDEX: 48 KG/M2 | WEIGHT: 288.13 LBS

## 2024-08-20 DIAGNOSIS — E66.9 OBESITY: Primary | ICD-10-CM

## 2024-08-20 NOTE — PROGRESS NOTES
Date of education: 8/20/24  Pt education type: [x]Pre op  []Post op  Surgery date: 9/18/24  Type of surgery: sleeve gastrectomy     Education was provided on:   [x]Importance of protein and vitamin protocol  [x]Importance of drinking  fl oz/day of non carbonated sugar free non caffeinated beverages  [x]Importance of following dietary protocol  []Importance of early ambulation postop   []Use of incentive spirometer 10 times an hour while awake  []Non opiod pain management post op   []Discontinuing use of meds containing aspirin, ibuprofen, NSAIDs, post op  []Signs and symptoms of immediate and long term complications post-op  []Prevention and signs and symptoms of blood clots   []Prevention and signs of infection  []Reviewed medication regimen  []Importance of adhering to behavioral changes  []Importance of following exercise protocol      Barriers to learning:  [x]None evident  []Acuity of illness  []Cognitive defects  []Cultural barriers  []Desire/Motivation  []Difficulty concentrating  []Emotional state  []Financial concerns  []Hearing deficit  []Language barrier  []Literacy  []Memory problems  []Vision impairment     Teaching methods:  []Demonstration  [x]Explanation  [x]Printed materials  [x]Teach back  []Virtual/web based    Expected Compliance:  [x]Good  []Fair  []Poor    Additional Notes:

## 2024-08-21 NOTE — PROGRESS NOTES
Date of education: 8/20/24  Pt education type: [x]Pre op  []Post op  Surgery date: 9/18/24  Type of surgery: sleeve gastrectomy     Education was provided on:   []Importance of protein and vitamin protocol  []Importance of drinking  fl oz/day of non carbonated sugar free non caffeinated beverages  []Importance of following dietary protocol  [x]Importance of early ambulation postop   [x]Use of incentive spirometer 10 times an hour while awake  [x]Non opiod pain management post op   [x]Discontinuing use of meds containing aspirin, ibuprofen, NSAIDs, post op  [x]Signs and symptoms of immediate and long term complications post-op  [x]Prevention and signs and symptoms of blood clots   [x]Prevention and signs of infection  [x]Reviewed medication regimen  []Importance of adhering to behavioral changes  []Importance of following exercise protocol      Barriers to learning:  [x]None evident  []Acuity of illness  []Cognitive defects  []Cultural barriers  []Desire/Motivation  []Difficulty concentrating  []Emotional state  []Financial concerns  []Hearing deficit  []Language barrier  []Literacy  []Memory problems  []Vision impairment     Teaching methods:  []Demonstration  [x]Explanation  [x]Printed materials  [x]Teach back  []Virtual/web based    Expected Compliance:  [x]Good  []Fair  []Poor    Additional Notes:

## 2024-08-30 ENCOUNTER — TELEPHONE (OUTPATIENT)
Dept: SURGERY | Facility: CLINIC | Age: 39
End: 2024-08-30
Payer: MEDICAID

## 2024-08-30 NOTE — PROGRESS NOTES
"  Patient ID: [id number]   Chief Complaint: Pre-op Exam (VSG 09/18/24- [facility] )    HPI:     History of Present Illness:  Sonia Stock is a 38 y.o. female here today for routine preop appt. The pt is scheduled for a laparoscopic sleeve gastrectomy on 9/18/24. Pt Pt denies any changes to  history since last examination. All preop requirements completed. No complaints. Ready to proceed.     H.Pylori (8/12/24): negative  EGD (11/17/23): normal     Ht: 65in  Weights:   Trend Weights BMI   Starting 293# 48   Pre-Op 286# 47   2 Week     2 Month     6 Month      1 Year     2 Year               For Adults 20 Years and Older:  BMI Weight Status   Below 18.5 Underweight   18.6-24.9 Normal/Healthy   25.0-29.9 Overweight   30.0 & Above Obese     Ideal Weight Range for Your Height: 5'5" = 114 - 149 lbs      Pertinent History:  HTN - [x] Yes   [] No    [] Resolved  HLD - [] Yes   [x] No    [] Resolved  DM  -  [] Yes   [x] No    [] Resolved  VANNESSA - [] Yes   [x] No   [] Resolved  GERD - [] Yes   [x] No [] Resolved  Anticoagulation- [] Yes   [x] No      If yes, type: [] ASA [] Plavix [] Other    Patient Care Team:  No Ag NP as PCP - General (Urgent Care)  Eric Al MD as Surgeon (Bariatrics)     Subjective:     See HPI for details    Constitutional: Denies Change in appetite. Denies Chills. Denies Fever. Denies Night sweats.  Respiratory: Denies Cough. Denies Shortness of breath. Denies Shortness of breath with exertion. Denies Wheezing.  Cardiovascular: Denies Chest pain at rest. Denies Chest pain with exertion. Denies Irregular heartbeat. Denies Palpitations. Denies Edema.  Gastrointestinal: Denies Abdominal pain. DeniesDiarrhea. Denies Nausea. Denies Vomiting. Denies Hematemesis or Hematochezia.  Genitourinary: Denies Dysuria. Denies Urinary frequency. Denies Urinary urgency. Denies Blood in urine.  Endocrine: Denies Cold intolerance. Denies Excessive thirst. Denies Heat intolerance. Endorses Weight loss. " "  Musculoskeletal: Denies Painful joints. Denies Weakness.  Integumentary: Denies Rash. Denies Itching. Denies Dry skin.  Neurologic: Denies Dizziness. Denies Fainting. Denies Headache.  Psychiatric: Denies Depression. Denies Anxiety. Denies Suicidal/Homicidal ideations.    12 point review of systems conducted, negative except as stated in the history of present illness. See HPI for details.    Review of patient's allergies indicates:   Allergen Reactions    Aspirin Hives    Azithromycin Rash     Past Medical History:   Diagnosis Date    Hypertension     Left bundle branch block     Obesity      Past Surgical History:   Procedure Laterality Date    BILATERAL TUBAL LIGATION      CHOLECYSTECTOMY      DILATION AND CURETTAGE OF UTERUS      ESOPHAGOGASTRODUODENOSCOPY N/A 11/17/2023    Procedure: EGD (ESOPHAGOGASTRODUODENOSCOPY);  Surgeon: Eric Al MD;  Location: [facility];  Service: General;  Laterality: N/A;    TUBAL LIGATION       No family history on file.  Social History     Tobacco Use    Smoking status: Never    Smokeless tobacco: Never   Substance Use Topics    Alcohol use: Yes      Current Outpatient Medications   Medication Instructions    hydroCHLOROthiazide (MICROZIDE) 12.5 mg       Objective:     Vital Signs (Most Recent):  Visit Vitals  /83   Pulse 62   Ht 5' 5" (1.651 m)   Wt 129.9 kg (286 lb 6.4 oz)   BMI 47.66 kg/m²       Physical Exam:  General:  Alert and oriented.    Respiratory:  Lungs are clear to auscultation, Respirations are non-labored, Breath sounds are equal.    Cardiovascular:  Normal rate, Regular rhythm, No murmur.    Gastrointestinal:  Soft, Non-tender, Non-distended, Normal bowel sounds        Musculoskeletal:  Normal range of motion, Normal strength.    Integumentary:  Warm, Dry, Pink.    Neurologic:  Alert, Oriented.    Psychiatric:  Cooperative.      Assessment:       ICD-10-CM ICD-9-CM   1. Pre-operative examination  Z01.818 V72.84   2. Encounter for weight loss " counseling  Z71.3 V65.3   3. Encounter for pre-bariatric surgery counseling and education  Z71.89 V65.49   4. Exercise counseling  Z71.82 V65.41   5. Dietary counseling  Z71.3 V65.3   6. Morbid obesity  E66.01 278.01       Plan:     1. Pre-operative examination  APTT    Comprehensive Metabolic Panel    CBC Auto Differential      2. Encounter for weight loss counseling        3. Encounter for pre-bariatric surgery counseling and education        4. Exercise counseling        5. Dietary counseling        6. Morbid obesity            Plan:     Sonia Stock has voluntarily decided to undergo laparoscopic sleeve gastrectomy under the care of Eric Al MD. The patient has stated that the performance of this procedure requires major intra-abdominal surgery and therefore carries certain risks. Like all major surgery, there is a chance of death during or immediately following the procedure. The risk varies depending on the age, weight and the medical background of each individual patient.    Additionally, there can be complications as a result of the procedure. General anesthesia is a required and a respirator is necessary during the operation. The risk is greater in patients with a history of smoking, that weigh more than 400 pounds, have a BMI >55 or can not walk up a flight of stairs.    Performance of the surgery requires a long division of the stomach with a special stapler. If leakage occurs, additional surgery or drainage procedures may need to be performed. In addition, since the stomach lies in close proximity to the spleen, the possibility of splenic injury requiring splenectomy may occur.    In addition, complications may occur in wound healing. These may include wound infections, fascial disruptions and hernias which may require future surgical repair. Wound drainage is common in obese patients. In addition to these outlined complications, there can be additional problems which can occur in  all major operations. These include infection, unexpected myocardial infarctions and the formation of blood clots leading to pulmonary embolism.    The patient voiced there understanding of the above and that the procedure may be converted to an open procedure if the surgeon feels the surgery can not safely continue laparoscopically.    The patient also voiced there understanding that patients who undergo this surgery generally lose a significant amount of weight and keep it off, but no operation can guarantee permanent weight reduction.    All questions were answered in regards to surgery. I once again reviewed all the risks / benefits of surgery with the patient in regards to the laparoscopic sleeve gastrectomy , the patient voiced their understanding and wishes to continue.     - preop labs   - Continue preop diet  - All questions answered and pt to sign consent with Eric Al MD AM of surgery    1. Pre-operative examination  -     APTT; Future; Expected date: 09/03/2024  -     Comprehensive Metabolic Panel; Future; Expected date: 09/03/2024  -     CBC Auto Differential; Future; Expected date: 09/03/2024    2. Encounter for weight loss counseling    3. Encounter for pre-bariatric surgery counseling and education    4. Exercise counseling    5. Dietary counseling    6. Morbid obesity         Future Appointments   Date Time Provider Department Center   9/3/2024  1:30 PM Bri Blackmon FNP [facility] [place] [facility]   9/11/2024  9:20 AM CLASS, [facility] BARIATRIC SURGERY CLASS [facility]   10/8/2024  2:15 PM Eric Al MD [facility] [place] [facility]   11/19/2024  2:00 PM Bri Blackmon FNP [facility] [place]   3/18/2025  1:30 PM Bri Blackmon FNP [facility] [place] [facility]   9/18/2025  9:30 AM Bri Blackmon FNP [facility] [place] [facility]        LULU Costa

## 2024-09-03 ENCOUNTER — CLINICAL SUPPORT (OUTPATIENT)
Dept: BARIATRICS | Facility: HOSPITAL | Age: 39
End: 2024-09-03

## 2024-09-03 ENCOUNTER — LAB VISIT (OUTPATIENT)
Dept: LAB | Facility: HOSPITAL | Age: 39
End: 2024-09-03
Attending: SURGERY
Payer: MEDICAID

## 2024-09-03 ENCOUNTER — OFFICE VISIT (OUTPATIENT)
Dept: SURGERY | Facility: CLINIC | Age: 39
End: 2024-09-03
Payer: MEDICAID

## 2024-09-03 ENCOUNTER — ANESTHESIA EVENT (OUTPATIENT)
Dept: SURGERY | Facility: HOSPITAL | Age: 39
End: 2024-09-03
Payer: MEDICAID

## 2024-09-03 VITALS
HEART RATE: 62 BPM | HEIGHT: 65 IN | DIASTOLIC BLOOD PRESSURE: 83 MMHG | WEIGHT: 286.38 LBS | SYSTOLIC BLOOD PRESSURE: 121 MMHG | BODY MASS INDEX: 47.71 KG/M2

## 2024-09-03 DIAGNOSIS — Z71.82 EXERCISE COUNSELING: ICD-10-CM

## 2024-09-03 DIAGNOSIS — Z01.818 PRE-OPERATIVE EXAMINATION: Primary | ICD-10-CM

## 2024-09-03 DIAGNOSIS — Z01.818 PRE-OPERATIVE EXAMINATION: ICD-10-CM

## 2024-09-03 DIAGNOSIS — Z71.3 ENCOUNTER FOR WEIGHT LOSS COUNSELING: ICD-10-CM

## 2024-09-03 DIAGNOSIS — Z71.89 ENCOUNTER FOR PRE-BARIATRIC SURGERY COUNSELING AND EDUCATION: ICD-10-CM

## 2024-09-03 DIAGNOSIS — E66.01 MORBID OBESITY WITH BMI OF 45.0-49.9, ADULT: Primary | ICD-10-CM

## 2024-09-03 DIAGNOSIS — E66.9 OBESITY: Primary | ICD-10-CM

## 2024-09-03 DIAGNOSIS — E66.01 MORBID OBESITY: ICD-10-CM

## 2024-09-03 DIAGNOSIS — Z71.3 DIETARY COUNSELING: ICD-10-CM

## 2024-09-03 PROBLEM — I10 HYPERTENSION: Status: ACTIVE | Noted: 2024-09-03

## 2024-09-03 LAB
ALBUMIN SERPL-MCNC: 4.2 G/DL (ref 3.5–5)
ALBUMIN/GLOB SERPL: 1.4 RATIO (ref 1.1–2)
ALP SERPL-CCNC: 56 UNIT/L (ref 40–150)
ALT SERPL-CCNC: 7 UNIT/L (ref 0–55)
ANION GAP SERPL CALC-SCNC: 9 MEQ/L
APTT PPP: 28.6 SECONDS (ref 23.2–33.7)
AST SERPL-CCNC: 14 UNIT/L (ref 5–34)
BASOPHILS # BLD AUTO: 0.05 X10(3)/MCL
BASOPHILS NFR BLD AUTO: 0.6 %
BILIRUB SERPL-MCNC: 0.3 MG/DL
BUN SERPL-MCNC: 12.9 MG/DL (ref 7–18.7)
CALCIUM SERPL-MCNC: 9.7 MG/DL (ref 8.4–10.2)
CHLORIDE SERPL-SCNC: 105 MMOL/L (ref 98–107)
CO2 SERPL-SCNC: 26 MMOL/L (ref 22–29)
CREAT SERPL-MCNC: 0.87 MG/DL (ref 0.55–1.02)
CREAT/UREA NIT SERPL: 15
EOSINOPHIL # BLD AUTO: 0.04 X10(3)/MCL (ref 0–0.9)
EOSINOPHIL NFR BLD AUTO: 0.5 %
ERYTHROCYTE [DISTWIDTH] IN BLOOD BY AUTOMATED COUNT: 13.1 % (ref 11.5–17)
GFR SERPLBLD CREATININE-BSD FMLA CKD-EPI: >60 ML/MIN/1.73/M2
GLOBULIN SER-MCNC: 3.1 GM/DL (ref 2.4–3.5)
GLUCOSE SERPL-MCNC: 91 MG/DL (ref 74–100)
HCT VFR BLD AUTO: 40.4 % (ref 37–47)
HGB BLD-MCNC: 13.1 G/DL (ref 12–16)
IMM GRANULOCYTES # BLD AUTO: 0.03 X10(3)/MCL (ref 0–0.04)
IMM GRANULOCYTES NFR BLD AUTO: 0.4 %
LYMPHOCYTES # BLD AUTO: 2.39 X10(3)/MCL (ref 0.6–4.6)
LYMPHOCYTES NFR BLD AUTO: 30.9 %
MCH RBC QN AUTO: 27.9 PG (ref 27–31)
MCHC RBC AUTO-ENTMCNC: 32.4 G/DL (ref 33–36)
MCV RBC AUTO: 86.1 FL (ref 80–94)
MONOCYTES # BLD AUTO: 0.44 X10(3)/MCL (ref 0.1–1.3)
MONOCYTES NFR BLD AUTO: 5.7 %
NEUTROPHILS # BLD AUTO: 4.78 X10(3)/MCL (ref 2.1–9.2)
NEUTROPHILS NFR BLD AUTO: 61.9 %
NRBC BLD AUTO-RTO: 0 %
PLATELET # BLD AUTO: 376 X10(3)/MCL (ref 130–400)
PMV BLD AUTO: 8.6 FL (ref 7.4–10.4)
POTASSIUM SERPL-SCNC: 3.9 MMOL/L (ref 3.5–5.1)
PROT SERPL-MCNC: 7.3 GM/DL (ref 6.4–8.3)
RBC # BLD AUTO: 4.69 X10(6)/MCL (ref 4.2–5.4)
SODIUM SERPL-SCNC: 140 MMOL/L (ref 136–145)
WBC # BLD AUTO: 7.73 X10(3)/MCL (ref 4.5–11.5)

## 2024-09-03 PROCEDURE — 3074F SYST BP LT 130 MM HG: CPT | Mod: CPTII,,, | Performed by: NURSE PRACTITIONER

## 2024-09-03 PROCEDURE — 36415 COLL VENOUS BLD VENIPUNCTURE: CPT

## 2024-09-03 PROCEDURE — 99214 OFFICE O/P EST MOD 30 MIN: CPT | Mod: ,,, | Performed by: NURSE PRACTITIONER

## 2024-09-03 PROCEDURE — 3079F DIAST BP 80-89 MM HG: CPT | Mod: CPTII,,, | Performed by: NURSE PRACTITIONER

## 2024-09-03 PROCEDURE — 1159F MED LIST DOCD IN RCRD: CPT | Mod: CPTII,,, | Performed by: NURSE PRACTITIONER

## 2024-09-03 PROCEDURE — 3008F BODY MASS INDEX DOCD: CPT | Mod: CPTII,,, | Performed by: NURSE PRACTITIONER

## 2024-09-03 NOTE — PROGRESS NOTES
"Pt attended pre-op visit with bariatric team and completed questionnaire. Pre- and immediate post-op guidelines were reviewed. Pt confirmed knowledge and expressed understanding.     Height:   Ht Readings from Last 1 Encounters:   08/20/24 5' 5" (1.651 m)      Weight:   Wt Readings from Last 3 Encounters:   08/20/24 0804 130.7 kg (288 lb 1.6 oz)   07/31/24 1516 129.4 kg (285 lb 4.8 oz)   06/20/24 1604 133 kg (293 lb 3.2 oz)      BMI:   BMI Readings from Last 1 Encounters:   08/20/24 47.94 kg/m²        Weight loss since pre-op class: lost 1.5#    **Pt said she "forgot about the pre-op diet" so she started it a day later than supposed to (on 8/29 instead of 8/28). She also said she has not bought a food scale yet so she has been eyeballing portions of food. Reinforced importance of following diet exactly for surgery and to reduce complication risk of bariatric surgery. Pt understood and said she was going to buy food scale today.**  "

## 2024-09-03 NOTE — H&P
"  Patient ID: [id number]   Chief Complaint: Pre-op Exam (VSG 09/18/24- [facility] )    HPI:     History of Present Illness:  Sonia Stock is a 38 y.o. female here today for routine preop appt. The pt is scheduled for a laparoscopic sleeve gastrectomy on 9/18/24. Pt Pt denies any changes to  history since last examination. All preop requirements completed. No complaints. Ready to proceed.     H.Pylori (8/12/24): negative  EGD (11/17/23): normal     Ht: 65in  Weights:   Trend Weights BMI   Starting 293# 48   Pre-Op 286# 47   2 Week     2 Month     6 Month      1 Year     2 Year               For Adults 20 Years and Older:  BMI Weight Status   Below 18.5 Underweight   18.6-24.9 Normal/Healthy   25.0-29.9 Overweight   30.0 & Above Obese     Ideal Weight Range for Your Height: 5'5" = 114 - 149 lbs      Pertinent History:  HTN - [x] Yes   [] No    [] Resolved  HLD - [] Yes   [x] No    [] Resolved  DM  -  [] Yes   [x] No    [] Resolved  VANNESSA - [] Yes   [x] No   [] Resolved  GERD - [] Yes   [x] No [] Resolved  Anticoagulation- [] Yes   [x] No      If yes, type: [] ASA [] Plavix [] Other    Patient Care Team:  No Ag NP as PCP - General (Urgent Care)  Eric Al MD as Surgeon (Bariatrics)     Subjective:     See HPI for details    Constitutional: Denies Change in appetite. Denies Chills. Denies Fever. Denies Night sweats.  Respiratory: Denies Cough. Denies Shortness of breath. Denies Shortness of breath with exertion. Denies Wheezing.  Cardiovascular: Denies Chest pain at rest. Denies Chest pain with exertion. Denies Irregular heartbeat. Denies Palpitations. Denies Edema.  Gastrointestinal: Denies Abdominal pain. DeniesDiarrhea. Denies Nausea. Denies Vomiting. Denies Hematemesis or Hematochezia.  Genitourinary: Denies Dysuria. Denies Urinary frequency. Denies Urinary urgency. Denies Blood in urine.  Endocrine: Denies Cold intolerance. Denies Excessive thirst. Denies Heat intolerance. Endorses Weight loss. " "  Musculoskeletal: Denies Painful joints. Denies Weakness.  Integumentary: Denies Rash. Denies Itching. Denies Dry skin.  Neurologic: Denies Dizziness. Denies Fainting. Denies Headache.  Psychiatric: Denies Depression. Denies Anxiety. Denies Suicidal/Homicidal ideations.    12 point review of systems conducted, negative except as stated in the history of present illness. See HPI for details.    Review of patient's allergies indicates:   Allergen Reactions    Aspirin Hives    Azithromycin Rash     Past Medical History:   Diagnosis Date    Hypertension     Left bundle branch block     Obesity      Past Surgical History:   Procedure Laterality Date    BILATERAL TUBAL LIGATION      CHOLECYSTECTOMY      DILATION AND CURETTAGE OF UTERUS      ESOPHAGOGASTRODUODENOSCOPY N/A 11/17/2023    Procedure: EGD (ESOPHAGOGASTRODUODENOSCOPY);  Surgeon: Eric Al MD;  Location: [facility];  Service: General;  Laterality: N/A;    TUBAL LIGATION       No family history on file.  Social History     Tobacco Use    Smoking status: Never    Smokeless tobacco: Never   Substance Use Topics    Alcohol use: Yes      Current Outpatient Medications   Medication Instructions    hydroCHLOROthiazide (MICROZIDE) 12.5 mg       Objective:     Vital Signs (Most Recent):  Visit Vitals  /83   Pulse 62   Ht 5' 5" (1.651 m)   Wt 129.9 kg (286 lb 6.4 oz)   BMI 47.66 kg/m²       Physical Exam:  General:  Alert and oriented.    Respiratory:  Lungs are clear to auscultation, Respirations are non-labored, Breath sounds are equal.    Cardiovascular:  Normal rate, Regular rhythm, No murmur.    Gastrointestinal:  Soft, Non-tender, Non-distended, Normal bowel sounds        Musculoskeletal:  Normal range of motion, Normal strength.    Integumentary:  Warm, Dry, Pink.    Neurologic:  Alert, Oriented.    Psychiatric:  Cooperative.      Assessment:       ICD-10-CM ICD-9-CM   1. Pre-operative examination  Z01.818 V72.84   2. Encounter for weight loss " counseling  Z71.3 V65.3   3. Encounter for pre-bariatric surgery counseling and education  Z71.89 V65.49   4. Exercise counseling  Z71.82 V65.41   5. Dietary counseling  Z71.3 V65.3   6. Morbid obesity  E66.01 278.01       Plan:     1. Pre-operative examination  APTT    Comprehensive Metabolic Panel    CBC Auto Differential      2. Encounter for weight loss counseling        3. Encounter for pre-bariatric surgery counseling and education        4. Exercise counseling        5. Dietary counseling        6. Morbid obesity            Plan:     Sonia Stock has voluntarily decided to undergo laparoscopic sleeve gastrectomy under the care of Eric Al MD. The patient has stated that the performance of this procedure requires major intra-abdominal surgery and therefore carries certain risks. Like all major surgery, there is a chance of death during or immediately following the procedure. The risk varies depending on the age, weight and the medical background of each individual patient.    Additionally, there can be complications as a result of the procedure. General anesthesia is a required and a respirator is necessary during the operation. The risk is greater in patients with a history of smoking, that weigh more than 400 pounds, have a BMI >55 or can not walk up a flight of stairs.    Performance of the surgery requires a long division of the stomach with a special stapler. If leakage occurs, additional surgery or drainage procedures may need to be performed. In addition, since the stomach lies in close proximity to the spleen, the possibility of splenic injury requiring splenectomy may occur.    In addition, complications may occur in wound healing. These may include wound infections, fascial disruptions and hernias which may require future surgical repair. Wound drainage is common in obese patients. In addition to these outlined complications, there can be additional problems which can occur in  all major operations. These include infection, unexpected myocardial infarctions and the formation of blood clots leading to pulmonary embolism.    The patient voiced there understanding of the above and that the procedure may be converted to an open procedure if the surgeon feels the surgery can not safely continue laparoscopically.    The patient also voiced there understanding that patients who undergo this surgery generally lose a significant amount of weight and keep it off, but no operation can guarantee permanent weight reduction.    All questions were answered in regards to surgery. I once again reviewed all the risks / benefits of surgery with the patient in regards to the laparoscopic sleeve gastrectomy , the patient voiced their understanding and wishes to continue.     - New request for cardiac clearance sent.   - preop labs   - Continue preop diet  - All questions answered and pt to sign consent with Eric Al MD AM of surgery    1. Pre-operative examination  -     APTT; Future; Expected date: 09/03/2024  -     Comprehensive Metabolic Panel; Future; Expected date: 09/03/2024  -     CBC Auto Differential; Future; Expected date: 09/03/2024    2. Encounter for weight loss counseling    3. Encounter for pre-bariatric surgery counseling and education    4. Exercise counseling    5. Dietary counseling    6. Morbid obesity         Future Appointments   Date Time Provider Department Center   9/3/2024  1:30 PM Bri Blackmon FNP [facility] [place]   9/11/2024  9:20 AM CLASS, [facility] BARIATRIC SURGERY CLASS [facility]   10/8/2024  2:15 PM Eric Al MD [facility]   11/19/2024  2:00 PM Bri Blackmon FNP [facility] [place]   3/18/2025  1:30 PM Bri Blackmon FNP [facility] [place] [facility]   9/18/2025  9:30 AM Bri Blackmon FNP [facility] [place]        LULU Costa

## 2024-09-03 NOTE — PROGRESS NOTES
Patient attended a preop education visit with the team. Patient's current weight is 286.6. Patient has lost 1.5 lbs. On the preop diet.    Patient missed 1 on questions 13-18. Corrections were discussed. No issues noted.     SHANA Barnett, CPT, CHC

## 2024-09-03 NOTE — H&P (VIEW-ONLY)
"  Patient ID: [id number]   Chief Complaint: Pre-op Exam (VSG 09/18/24- [facility] )    HPI:     History of Present Illness:  Sonia Stock is a 38 y.o. female here today for routine preop appt. The pt is scheduled for a laparoscopic sleeve gastrectomy on 9/18/24. Pt Pt denies any changes to  history since last examination. All preop requirements completed. No complaints. Ready to proceed.     H.Pylori (8/12/24): negative  EGD (11/17/23): normal     Ht: 65in  Weights:   Trend Weights BMI   Starting 293# 48   Pre-Op 286# 47   2 Week     2 Month     6 Month      1 Year     2 Year               For Adults 20 Years and Older:  BMI Weight Status   Below 18.5 Underweight   18.6-24.9 Normal/Healthy   25.0-29.9 Overweight   30.0 & Above Obese     Ideal Weight Range for Your Height: 5'5" = 114 - 149 lbs      Pertinent History:  HTN - [x] Yes   [] No    [] Resolved  HLD - [] Yes   [x] No    [] Resolved  DM  -  [] Yes   [x] No    [] Resolved  VANNESSA - [] Yes   [x] No   [] Resolved  GERD - [] Yes   [x] No [] Resolved  Anticoagulation- [] Yes   [x] No      If yes, type: [] ASA [] Plavix [] Other    Patient Care Team:  No Ag NP as PCP - General (Urgent Care)  Eric Al MD as Surgeon (Bariatrics)     Subjective:     See HPI for details    Constitutional: Denies Change in appetite. Denies Chills. Denies Fever. Denies Night sweats.  Respiratory: Denies Cough. Denies Shortness of breath. Denies Shortness of breath with exertion. Denies Wheezing.  Cardiovascular: Denies Chest pain at rest. Denies Chest pain with exertion. Denies Irregular heartbeat. Denies Palpitations. Denies Edema.  Gastrointestinal: Denies Abdominal pain. DeniesDiarrhea. Denies Nausea. Denies Vomiting. Denies Hematemesis or Hematochezia.  Genitourinary: Denies Dysuria. Denies Urinary frequency. Denies Urinary urgency. Denies Blood in urine.  Endocrine: Denies Cold intolerance. Denies Excessive thirst. Denies Heat intolerance. Endorses Weight loss. " "  Musculoskeletal: Denies Painful joints. Denies Weakness.  Integumentary: Denies Rash. Denies Itching. Denies Dry skin.  Neurologic: Denies Dizziness. Denies Fainting. Denies Headache.  Psychiatric: Denies Depression. Denies Anxiety. Denies Suicidal/Homicidal ideations.    12 point review of systems conducted, negative except as stated in the history of present illness. See HPI for details.    Review of patient's allergies indicates:   Allergen Reactions    Aspirin Hives    Azithromycin Rash     Past Medical History:   Diagnosis Date    Hypertension     Left bundle branch block     Obesity      Past Surgical History:   Procedure Laterality Date    BILATERAL TUBAL LIGATION      CHOLECYSTECTOMY      DILATION AND CURETTAGE OF UTERUS      ESOPHAGOGASTRODUODENOSCOPY N/A 11/17/2023    Procedure: EGD (ESOPHAGOGASTRODUODENOSCOPY);  Surgeon: Eric Al MD;  Location: [facility];  Service: General;  Laterality: N/A;    TUBAL LIGATION       No family history on file.  Social History     Tobacco Use    Smoking status: Never    Smokeless tobacco: Never   Substance Use Topics    Alcohol use: Yes      Current Outpatient Medications   Medication Instructions    hydroCHLOROthiazide (MICROZIDE) 12.5 mg       Objective:     Vital Signs (Most Recent):  Visit Vitals  /83   Pulse 62   Ht 5' 5" (1.651 m)   Wt 129.9 kg (286 lb 6.4 oz)   BMI 47.66 kg/m²       Physical Exam:  General:  Alert and oriented.    Respiratory:  Lungs are clear to auscultation, Respirations are non-labored, Breath sounds are equal.    Cardiovascular:  Normal rate, Regular rhythm, No murmur.    Gastrointestinal:  Soft, Non-tender, Non-distended, Normal bowel sounds        Musculoskeletal:  Normal range of motion, Normal strength.    Integumentary:  Warm, Dry, Pink.    Neurologic:  Alert, Oriented.    Psychiatric:  Cooperative.      Assessment:       ICD-10-CM ICD-9-CM   1. Pre-operative examination  Z01.818 V72.84   2. Encounter for weight loss " counseling  Z71.3 V65.3   3. Encounter for pre-bariatric surgery counseling and education  Z71.89 V65.49   4. Exercise counseling  Z71.82 V65.41   5. Dietary counseling  Z71.3 V65.3   6. Morbid obesity  E66.01 278.01       Plan:     1. Pre-operative examination  APTT    Comprehensive Metabolic Panel    CBC Auto Differential      2. Encounter for weight loss counseling        3. Encounter for pre-bariatric surgery counseling and education        4. Exercise counseling        5. Dietary counseling        6. Morbid obesity            Plan:     Sonia Stock has voluntarily decided to undergo laparoscopic sleeve gastrectomy under the care of Eric Al MD. The patient has stated that the performance of this procedure requires major intra-abdominal surgery and therefore carries certain risks. Like all major surgery, there is a chance of death during or immediately following the procedure. The risk varies depending on the age, weight and the medical background of each individual patient.    Additionally, there can be complications as a result of the procedure. General anesthesia is a required and a respirator is necessary during the operation. The risk is greater in patients with a history of smoking, that weigh more than 400 pounds, have a BMI >55 or can not walk up a flight of stairs.    Performance of the surgery requires a long division of the stomach with a special stapler. If leakage occurs, additional surgery or drainage procedures may need to be performed. In addition, since the stomach lies in close proximity to the spleen, the possibility of splenic injury requiring splenectomy may occur.    In addition, complications may occur in wound healing. These may include wound infections, fascial disruptions and hernias which may require future surgical repair. Wound drainage is common in obese patients. In addition to these outlined complications, there can be additional problems which can occur in  all major operations. These include infection, unexpected myocardial infarctions and the formation of blood clots leading to pulmonary embolism.    The patient voiced there understanding of the above and that the procedure may be converted to an open procedure if the surgeon feels the surgery can not safely continue laparoscopically.    The patient also voiced there understanding that patients who undergo this surgery generally lose a significant amount of weight and keep it off, but no operation can guarantee permanent weight reduction.    All questions were answered in regards to surgery. I once again reviewed all the risks / benefits of surgery with the patient in regards to the laparoscopic sleeve gastrectomy , the patient voiced their understanding and wishes to continue.     - New request for cardiac clearance sent.   - preop labs   - Continue preop diet  - All questions answered and pt to sign consent with Eric Al MD AM of surgery    1. Pre-operative examination  -     APTT; Future; Expected date: 09/03/2024  -     Comprehensive Metabolic Panel; Future; Expected date: 09/03/2024  -     CBC Auto Differential; Future; Expected date: 09/03/2024    2. Encounter for weight loss counseling    3. Encounter for pre-bariatric surgery counseling and education    4. Exercise counseling    5. Dietary counseling    6. Morbid obesity         Future Appointments   Date Time Provider Department Center   9/3/2024  1:30 PM Bri Blackmon FNP [facility]   9/11/2024  9:20 AM CLASS, [facility] BARIATRIC SURGERY CLASS [facility]   10/8/2024  2:15 PM Eric Al MD [facility]   11/19/2024  2:00 PM Bri Blackmon FNP [facility]   3/18/2025  1:30 PM Bri Blackmon FNP [facility]   9/18/2025  9:30 AM Bri Blackmon FNP [facility]        LUL[facility]

## 2024-09-03 NOTE — ANESTHESIA PREPROCEDURE EVALUATION
Sonia Stock is a 38 y.o. female PRESENTING FOR GASTRECTOMY, SLEEVE, LAPAROSCOPIC (Abdomen)  with a history of   -MORBID OBESITY, BMI 48  -HTN  -HLD  -H/O L BBB    BETA-BLOCKER: NONE    New Orders for Anesthesia: UPT    Active Ambulatory Problems     Diagnosis Date Noted    Morbid obesity with BMI of 45.0-49.9, adult 11/17/2023    Hypertension 09/03/2024     Resolved Ambulatory Problems     Diagnosis Date Noted    No Resolved Ambulatory Problems     Past Medical History:   Diagnosis Date    Left bundle branch block     Obesity      Pre-op Assessment    I have reviewed the NPO Status.      Review of Systems  Anesthesia Hx:  No problems with previous Anesthesia                Social:  Non-Smoker       Cardiovascular:     Hypertension, well controlled           hyperlipidemia                             Pulmonary:  Pulmonary Normal                       Renal/:  Renal/ Normal                 Hepatic/GI:     GERD, well controlled             Neurological:  Neurology Normal                                      Endocrine:        Morbid Obesity / BMI > 40    Vitals:    09/18/24 0602 09/18/24 0615 09/18/24 0655   BP:  127/84 122/78   Pulse:  78 72   Resp:   20   Temp:  36.8 °C (98.2 °F) 36.3 °C (97.3 °F)   TempSrc:  Oral Temporal   SpO2:  95% 99%   Weight: 127.2 kg (280 lb 6.4 oz)           Physical Exam  General: Alert, Cooperative and Well nourished    Airway:  Mallampati: II   Mouth Opening: Normal  TM Distance: Normal  Tongue: Normal  Neck ROM: Normal ROM    Dental:  Intact    Chest/Lungs:  Clear to auscultation, Normal Respiratory Rate    Heart:  Rate: Normal  Rhythm: Regular Rhythm  Sounds: Normal       Latest Reference Range & Units 09/18/24 06:29   Beta HCG Qual Negative  Negative     Lab Results   Component Value Date    WBC 7.73 09/03/2024    HGB 13.1 09/03/2024    HCT 40.4 09/03/2024    MCV 86.1 09/03/2024     09/03/2024       CMP  Sodium   Date Value Ref Range Status   09/03/2024 140 136 -  145 mmol/L Final     Potassium   Date Value Ref Range Status   09/03/2024 3.9 3.5 - 5.1 mmol/L Final     Chloride   Date Value Ref Range Status   09/03/2024 105 98 - 107 mmol/L Final     CO2   Date Value Ref Range Status   09/03/2024 26 22 - 29 mmol/L Final     Blood Urea Nitrogen   Date Value Ref Range Status   09/03/2024 12.9 7.0 - 18.7 mg/dL Final     Creatinine   Date Value Ref Range Status   09/03/2024 0.87 0.55 - 1.02 mg/dL Final     Calcium   Date Value Ref Range Status   09/03/2024 9.7 8.4 - 10.2 mg/dL Final     Albumin   Date Value Ref Range Status   09/03/2024 4.2 3.5 - 5.0 g/dL Final     Bilirubin Total   Date Value Ref Range Status   09/03/2024 0.3 <=1.5 mg/dL Final     ALP   Date Value Ref Range Status   09/03/2024 56 40 - 150 unit/L Final     AST   Date Value Ref Range Status   09/03/2024 14 5 - 34 unit/L Final     ALT   Date Value Ref Range Status   09/03/2024 7 0 - 55 unit/L Final     eGFR   Date Value Ref Range Status   09/03/2024 >60 mL/min/1.73/m2 Final       CARDS OV 5/23/23  CARDS OV 8/30/24              Anesthesia Plan  Type of Anesthesia, risks & benefits discussed:    Anesthesia Type: Gen ETT  Intra-op Monitoring Plan: Standard ASA Monitors  Post Op Pain Control Plan: IV/PO Opioids PRN and multimodal analgesia  Induction:  IV  Airway Plan: Direct  Informed Consent: Informed consent signed with the Patient and all parties understand the risks and agree with anesthesia plan.  All questions answered.   ASA Score: 2  Day of Surgery Review of History & Physical: H&P Update referred to the surgeon/provider.    Ready For Surgery From Anesthesia Perspective.     .

## 2024-09-10 ENCOUNTER — TELEPHONE (OUTPATIENT)
Dept: SURGERY | Facility: CLINIC | Age: 39
End: 2024-09-10
Payer: MEDICAID

## 2024-09-10 NOTE — TELEPHONE ENCOUNTER
----- Message from Charisma Deleon MA sent at 8/30/2024 10:44 AM CDT -----  Fu on status of clearance   Surgery 9/18/24   We have a old one- need updated clearance

## 2024-09-12 RX ORDER — ATORVASTATIN CALCIUM 40 MG/1
40 TABLET, FILM COATED ORAL DAILY
COMMUNITY

## 2024-09-13 ENCOUNTER — CLINICAL SUPPORT (OUTPATIENT)
Dept: BARIATRICS | Facility: HOSPITAL | Age: 39
End: 2024-09-13

## 2024-09-13 VITALS — BODY MASS INDEX: 47.09 KG/M2 | WEIGHT: 283 LBS

## 2024-09-13 DIAGNOSIS — E66.01 MORBID OBESITY WITH BMI OF 45.0-49.9, ADULT: Primary | ICD-10-CM

## 2024-09-16 NOTE — DISCHARGE INSTRUCTIONS
HOSPITAL DISCHARGE INSTRUCTIONS    Clinic Phone Numbers       Surgeons office number and after hours on call surgeon: [phone].    ALWAYS call the surgeons office PRIOR to going to an Urgent Care or the emergency room. If medical emergency, call 911.     Signs and Symptoms that would warrant a phone call of the office (regardless of the time of day):     Fever greater than 101 F     Uncontrolled pain that does not improve with pain medication     Uncontrolled nausea that does not improve with nausea medication      Vomiting     Shortness of breath     Chest Pain     Foul smelling drainage from incision and/or yellow or green drainage from incision     Red, hot painful incisions     Bloody bowel movements     ** If you feel as though it is a life-threatening emergency, call 911 and go to the emergency room**          Prescriptions     Medications     Pain medication (if needed) Tylenol over the counter is safe to take for discomfort     Anti-nausea medication (if needed)     Proton Pump Inhibitor (finish all 30 days), call [phone] if you did not receive 30 days of medication       Supplements     Chewable multivitamin- take 2 times a day (unless otherwise directed)     Chewable Calcium Citrate with D3- take 3 times a day (unless otherwise directed)     Iron tablet- take once daily      MiraLAX- take once daily for 2 weeks       Home Medications     Please review your medication list that you received at pre-op class as well as at the hospital instructions upon discharge to assure you are resuming all medications that are deemed  safe after surgery.      ** REMINDER- You should have scheduled your follow-up with your prescribing doctor for 1-week post-op**          Appointments     Surgeons Post-op Visits- please review orange sheet you received in the mail after surgery. Call [phone] if you need to reschedule your surgeons post-op visit.      Bariatric Team Post-op Visits- please review blue sheet  you received in your e-mail or please reference [facility] for your post-op appointments. . Call [phone] option 1 if you need to reschedule your bariatric team post-op visit.          Nutritional Considerations     Hydration is CRITICAL!     Daily fluid intake of  oz water     Water is more important than protein      Review list of allowable and non-allowable liquids in your Bariatric Booklet    The only fluids that count towards your water goal is water, sugar free, caffeine free flavored water        Diet progression          Continue the dietary protocol until you meet with the dietitian at your 2-week visit     Strive to reach protein goal. Only liquids counted toward your protein goal are protein shake, milk and approved yogurt     It is MANDATORY that you do not progress your diet without speaking to the dietitian to prevent potential complications          Incisional Care     Wash your hands before you touch your incisions or dressings     Remove any gauze or dressing over your incision. ONLY steri-strips (butter-fly band aids) should remain over your incision     Shower daily with an anti-bacterial soap (Hibiclens or Dial, orange bar).      Allow water to hit your back in the shower     Wet the incisions with water     Apply soap to a clean washcloth and wash over your incisions (do not scrub)     Rinse your incision with water and pat dry with a clean towel      Check your incisions daily for any redness, swelling, hot to touch, or bright red, green or yellow drainage.             Dark red, dried blood, indention of an incision, bruising may appear under the steri-strips. This is normal.     Do not apply any creams, ointments, etc. on the incisions.      Leave incision open to air (unless instructed otherwise)          Activity     Walk and/or ride a stationary bike 20 minutes a day     Do not lift, pull or push anything greater than 10 pounds for 4-6 weeks     Do not go the gym until you are  4-6 weeks post-op     Use your incentive spirometer (breathing machine) 10 x an hour for 1-2 weeks     Shower daily, DO NOT submerge yourself in water until 2 weeks post-op and cleared by surgeon          Post-Operative Expectations     A soreness is to be expected. Pain differs for everyone. Please refer to the pain scale and list of when to call the doctor regarding pain.     Nausea can last a few weeks after surgery due to the body getting adjusted to the new small stomach. Take anti-nausea as needed and stay HYDRATED. Slight dehydration will cause nausea.     Constipation is common after surgery. Take MiraLAX daily even if your bowel movements are regular. Please refer to the FAQs regarding constipation. Water is essential in preventing constipation.        Constipation after Bariatric Surgery  The Basics     Due to the change in your diet just prior to surgery and immediately after surgery, it is very common to have a change in your bowel movements in the immediate post op period. It is completely normal to not have a bowel movement everyday in the first few weeks after bariatric surgery due to decreased oral intake. It is common not to have your first bowel movement for 4-5 days after surgery. If you don't have your first bowel movement 7 days after surgery, please contact surgeon's office to discuss.     What is constipation? -- Constipation is a common problem that makes it hard to have bowel movements. Your bowel movements might be:  Too hard  Too small  Hard to get out  Happening fewer than 3 times a week  What causes constipation after bariatric surgery? -- Constipation can be caused by:  High protein diet and decrease in water intake after bariatric surgery  What other symptoms should I watch for? -- These symptoms could signal a more serious problem:  Blood in the toilet or on the toilet paper after having a bowel movement  Fever  Feeling weak  It could also be a sign of a problem if you have new  constipation without a change in your medicines or diet, and have never had constipation in the past.   Is there anything I can do on my own to get rid of constipation? -- Yes. Try these steps:  Begin your MiraLAX the first day at home and continue it everyday if you are having normal soft bowel movement, even after the two weeks.   Please call the office [phone] if you do not have a bowel movement within 5 days of surgery.   Okay to take over the counter stool softeners once to twice per day, in addition to daily Miralax, if needed to help with post op constipation.   If you begin to have mutliple loose bowel movements (more than 3 per day), discontinue the stool softeners and Miralax.   If you continue to have multiple loose bowel movements per day (more than 5 loose bowel movements per day for more than 2 days in a row) after you have quit taking stool softeners and Miralax, contact surgeon's office to discuss this.  [phone]

## 2024-09-18 ENCOUNTER — ANESTHESIA (OUTPATIENT)
Dept: SURGERY | Facility: HOSPITAL | Age: 39
End: 2024-09-18
Payer: MEDICAID

## 2024-09-18 ENCOUNTER — HOSPITAL ENCOUNTER (INPATIENT)
Facility: HOSPITAL | Age: 39
LOS: 2 days | Discharge: HOME | DRG: 621 | End: 2024-09-20
Attending: SURGERY | Admitting: SURGERY
Payer: MEDICAID

## 2024-09-18 DIAGNOSIS — E66.01 MORBID OBESITY WITH BMI OF 45.0-49.9, ADULT: ICD-10-CM

## 2024-09-18 DIAGNOSIS — E66.01 MORBID OBESITY: ICD-10-CM

## 2024-09-18 LAB
B-HCG SERPL QL: NEGATIVE
GROUP & RH: NORMAL
INDIRECT COOMBS: NORMAL
POCT GLUCOSE: 159 MG/DL (ref 70–110)
POCT GLUCOSE: 161 MG/DL (ref 70–110)
POCT GLUCOSE: 198 MG/DL (ref 70–110)
SPECIMEN OUTDATE: NORMAL

## 2024-09-18 PROCEDURE — 43775 LAP SLEEVE GASTRECTOMY: CPT | Mod: ,,, | Performed by: SURGERY

## 2024-09-18 PROCEDURE — 36000710: Performed by: SURGERY

## 2024-09-18 PROCEDURE — 63600175 PHARM REV CODE 636 W HCPCS: Performed by: NURSE ANESTHETIST, CERTIFIED REGISTERED

## 2024-09-18 PROCEDURE — 99900035 HC TECH TIME PER 15 MIN (STAT)

## 2024-09-18 PROCEDURE — 27201423 OPTIME MED/SURG SUP & DEVICES STERILE SUPPLY: Performed by: SURGERY

## 2024-09-18 PROCEDURE — 63600175 PHARM REV CODE 636 W HCPCS: Performed by: NURSE PRACTITIONER

## 2024-09-18 PROCEDURE — 37000009 HC ANESTHESIA EA ADD 15 MINS: Performed by: SURGERY

## 2024-09-18 PROCEDURE — 0DB64Z3 EXCISION OF STOMACH, PERCUTANEOUS ENDOSCOPIC APPROACH, VERTICAL: ICD-10-PCS | Performed by: SURGERY

## 2024-09-18 PROCEDURE — 36000711: Performed by: SURGERY

## 2024-09-18 PROCEDURE — 86900 BLOOD TYPING SEROLOGIC ABO: CPT | Performed by: NURSE PRACTITIONER

## 2024-09-18 PROCEDURE — 63600175 PHARM REV CODE 636 W HCPCS: Mod: JZ,JG | Performed by: SURGERY

## 2024-09-18 PROCEDURE — 25000003 PHARM REV CODE 250: Performed by: NURSE PRACTITIONER

## 2024-09-18 PROCEDURE — 88307 TISSUE EXAM BY PATHOLOGIST: CPT | Mod: TC | Performed by: SURGERY

## 2024-09-18 PROCEDURE — 71000033 HC RECOVERY, INTIAL HOUR: Performed by: SURGERY

## 2024-09-18 PROCEDURE — 84703 CHORIONIC GONADOTROPIN ASSAY: CPT | Performed by: SURGERY

## 2024-09-18 PROCEDURE — 86850 RBC ANTIBODY SCREEN: CPT | Performed by: NURSE PRACTITIONER

## 2024-09-18 PROCEDURE — 25000003 PHARM REV CODE 250: Performed by: NURSE ANESTHETIST, CERTIFIED REGISTERED

## 2024-09-18 PROCEDURE — 11000001 HC ACUTE MED/SURG PRIVATE ROOM

## 2024-09-18 PROCEDURE — 86901 BLOOD TYPING SEROLOGIC RH(D): CPT | Performed by: NURSE PRACTITIONER

## 2024-09-18 PROCEDURE — 94799 UNLISTED PULMONARY SVC/PX: CPT | Mod: XB

## 2024-09-18 PROCEDURE — 37000008 HC ANESTHESIA 1ST 15 MINUTES: Performed by: SURGERY

## 2024-09-18 RX ORDER — PROCHLORPERAZINE EDISYLATE 5 MG/ML
5 INJECTION INTRAMUSCULAR; INTRAVENOUS EVERY 6 HOURS PRN
Status: DISCONTINUED | OUTPATIENT
Start: 2024-09-18 | End: 2024-09-20 | Stop reason: HOSPADM

## 2024-09-18 RX ORDER — HYDRALAZINE HYDROCHLORIDE 20 MG/ML
10 INJECTION INTRAMUSCULAR; INTRAVENOUS EVERY 6 HOURS PRN
Status: DISCONTINUED | OUTPATIENT
Start: 2024-09-18 | End: 2024-09-20 | Stop reason: HOSPADM

## 2024-09-18 RX ORDER — FENTANYL CITRATE 50 UG/ML
INJECTION, SOLUTION INTRAMUSCULAR; INTRAVENOUS
Status: DISCONTINUED | OUTPATIENT
Start: 2024-09-18 | End: 2024-09-18

## 2024-09-18 RX ORDER — GLYCOPYRROLATE 0.2 MG/ML
INJECTION INTRAMUSCULAR; INTRAVENOUS
Status: DISCONTINUED | OUTPATIENT
Start: 2024-09-18 | End: 2024-09-18

## 2024-09-18 RX ORDER — DEXAMETHASONE SODIUM PHOSPHATE 4 MG/ML
INJECTION, SOLUTION INTRA-ARTICULAR; INTRALESIONAL; INTRAMUSCULAR; INTRAVENOUS; SOFT TISSUE
Status: DISCONTINUED | OUTPATIENT
Start: 2024-09-18 | End: 2024-09-18

## 2024-09-18 RX ORDER — SODIUM CHLORIDE 0.9 % (FLUSH) 0.9 %
10 SYRINGE (ML) INJECTION
Status: DISCONTINUED | OUTPATIENT
Start: 2024-09-18 | End: 2024-09-20 | Stop reason: HOSPADM

## 2024-09-18 RX ORDER — MIDAZOLAM HYDROCHLORIDE 2 MG/2ML
2 INJECTION, SOLUTION INTRAMUSCULAR; INTRAVENOUS ONCE AS NEEDED
Status: DISCONTINUED | OUTPATIENT
Start: 2024-09-18 | End: 2024-09-18 | Stop reason: HOSPADM

## 2024-09-18 RX ORDER — PROPOFOL 10 MG/ML
VIAL (ML) INTRAVENOUS
Status: DISCONTINUED | OUTPATIENT
Start: 2024-09-18 | End: 2024-09-18

## 2024-09-18 RX ORDER — IBUPROFEN 200 MG
24 TABLET ORAL
Status: DISCONTINUED | OUTPATIENT
Start: 2024-09-18 | End: 2024-09-20 | Stop reason: HOSPADM

## 2024-09-18 RX ORDER — ACETAMINOPHEN 500 MG
1000 TABLET ORAL
Status: COMPLETED | OUTPATIENT
Start: 2024-09-18 | End: 2024-09-18

## 2024-09-18 RX ORDER — CEFAZOLIN SODIUM 2 G/50ML
2 SOLUTION INTRAVENOUS
Status: DISCONTINUED | OUTPATIENT
Start: 2024-09-18 | End: 2024-09-18 | Stop reason: HOSPADM

## 2024-09-18 RX ORDER — ONDANSETRON HYDROCHLORIDE 2 MG/ML
4 INJECTION, SOLUTION INTRAVENOUS EVERY 6 HOURS PRN
Status: DISCONTINUED | OUTPATIENT
Start: 2024-09-18 | End: 2024-09-18

## 2024-09-18 RX ORDER — SODIUM CHLORIDE, SODIUM LACTATE, POTASSIUM CHLORIDE, CALCIUM CHLORIDE 600; 310; 30; 20 MG/100ML; MG/100ML; MG/100ML; MG/100ML
INJECTION, SOLUTION INTRAVENOUS CONTINUOUS
Status: DISCONTINUED | OUTPATIENT
Start: 2024-09-18 | End: 2024-09-20 | Stop reason: HOSPADM

## 2024-09-18 RX ORDER — HYOSCYAMINE SULFATE 0.12 MG/1
0.12 TABLET SUBLINGUAL EVERY 4 HOURS PRN
Status: DISCONTINUED | OUTPATIENT
Start: 2024-09-18 | End: 2024-09-20 | Stop reason: HOSPADM

## 2024-09-18 RX ORDER — GABAPENTIN 300 MG/1
600 CAPSULE ORAL
Status: COMPLETED | OUTPATIENT
Start: 2024-09-18 | End: 2024-09-18

## 2024-09-18 RX ORDER — ACETAMINOPHEN 500 MG
1000 TABLET ORAL EVERY 8 HOURS
Status: DISCONTINUED | OUTPATIENT
Start: 2024-09-19 | End: 2024-09-20 | Stop reason: HOSPADM

## 2024-09-18 RX ORDER — EPHEDRINE SULFATE 50 MG/ML
INJECTION, SOLUTION INTRAVENOUS
Status: DISCONTINUED | OUTPATIENT
Start: 2024-09-18 | End: 2024-09-18

## 2024-09-18 RX ORDER — ACETAMINOPHEN 500 MG
1000 TABLET ORAL EVERY 8 HOURS
Qty: 18 TABLET | Refills: 0 | Status: SHIPPED | OUTPATIENT
Start: 2024-09-19 | End: 2024-09-22

## 2024-09-18 RX ORDER — HEPARIN SODIUM 5000 [USP'U]/ML
5000 INJECTION, SOLUTION INTRAVENOUS; SUBCUTANEOUS DAILY
Status: DISCONTINUED | OUTPATIENT
Start: 2024-09-18 | End: 2024-09-20 | Stop reason: HOSPADM

## 2024-09-18 RX ORDER — ONDANSETRON 4 MG/1
4 TABLET, ORALLY DISINTEGRATING ORAL
Status: COMPLETED | OUTPATIENT
Start: 2024-09-18 | End: 2024-09-18

## 2024-09-18 RX ORDER — MEPERIDINE HYDROCHLORIDE 25 MG/ML
12.5 INJECTION INTRAMUSCULAR; INTRAVENOUS; SUBCUTANEOUS EVERY 10 MIN PRN
Status: DISCONTINUED | OUTPATIENT
Start: 2024-09-18 | End: 2024-09-18

## 2024-09-18 RX ORDER — ONDANSETRON 4 MG/1
4 TABLET, ORALLY DISINTEGRATING ORAL EVERY 6 HOURS PRN
Qty: 20 TABLET | Refills: 0 | Status: SHIPPED | OUTPATIENT
Start: 2024-09-19

## 2024-09-18 RX ORDER — PROMETHAZINE HYDROCHLORIDE 25 MG/1
25 TABLET ORAL EVERY 6 HOURS PRN
Status: DISCONTINUED | OUTPATIENT
Start: 2024-09-18 | End: 2024-09-20 | Stop reason: HOSPADM

## 2024-09-18 RX ORDER — LABETALOL HCL 20 MG/4 ML
10 SYRINGE (ML) INTRAVENOUS
Status: DISCONTINUED | OUTPATIENT
Start: 2024-09-18 | End: 2024-09-20 | Stop reason: HOSPADM

## 2024-09-18 RX ORDER — ACETAMINOPHEN 10 MG/ML
1000 INJECTION, SOLUTION INTRAVENOUS EVERY 8 HOURS
Status: COMPLETED | OUTPATIENT
Start: 2024-09-18 | End: 2024-09-19

## 2024-09-18 RX ORDER — ONDANSETRON HYDROCHLORIDE 2 MG/ML
INJECTION, SOLUTION INTRAVENOUS
Status: DISCONTINUED | OUTPATIENT
Start: 2024-09-18 | End: 2024-09-18

## 2024-09-18 RX ORDER — ROCURONIUM BROMIDE 10 MG/ML
INJECTION, SOLUTION INTRAVENOUS
Status: DISCONTINUED | OUTPATIENT
Start: 2024-09-18 | End: 2024-09-18

## 2024-09-18 RX ORDER — LIDOCAINE HYDROCHLORIDE 20 MG/ML
INJECTION INTRAVENOUS
Status: DISCONTINUED | OUTPATIENT
Start: 2024-09-18 | End: 2024-09-18

## 2024-09-18 RX ORDER — CEFAZOLIN SODIUM 1 G/3ML
INJECTION, POWDER, FOR SOLUTION INTRAMUSCULAR; INTRAVENOUS
Status: DISCONTINUED | OUTPATIENT
Start: 2024-09-18 | End: 2024-09-18

## 2024-09-18 RX ORDER — GLUCAGON 1 MG
1 KIT INJECTION
Status: DISCONTINUED | OUTPATIENT
Start: 2024-09-18 | End: 2024-09-20 | Stop reason: HOSPADM

## 2024-09-18 RX ORDER — TRAMADOL HYDROCHLORIDE 50 MG/1
100 TABLET ORAL EVERY 6 HOURS PRN
Status: DISCONTINUED | OUTPATIENT
Start: 2024-09-18 | End: 2024-09-20 | Stop reason: HOSPADM

## 2024-09-18 RX ORDER — PHENYLEPHRINE HYDROCHLORIDE 10 MG/ML
INJECTION INTRAVENOUS
Status: DISCONTINUED | OUTPATIENT
Start: 2024-09-18 | End: 2024-09-18

## 2024-09-18 RX ORDER — MORPHINE SULFATE 2 MG/ML
2 INJECTION, SOLUTION INTRAMUSCULAR; INTRAVENOUS EVERY 5 MIN PRN
Status: DISCONTINUED | OUTPATIENT
Start: 2024-09-18 | End: 2024-09-18

## 2024-09-18 RX ORDER — PROMETHAZINE HYDROCHLORIDE 12.5 MG/1
12.5 TABLET ORAL EVERY 6 HOURS PRN
Qty: 20 TABLET | Refills: 0 | Status: SHIPPED | OUTPATIENT
Start: 2024-09-18

## 2024-09-18 RX ORDER — OXYCODONE HYDROCHLORIDE 5 MG/1
5 TABLET ORAL
Status: DISCONTINUED | OUTPATIENT
Start: 2024-09-18 | End: 2024-09-18

## 2024-09-18 RX ORDER — PANTOPRAZOLE SODIUM 40 MG/1
40 TABLET, DELAYED RELEASE ORAL DAILY
Status: DISCONTINUED | OUTPATIENT
Start: 2024-09-19 | End: 2024-09-20 | Stop reason: HOSPADM

## 2024-09-18 RX ORDER — BUPIVACAINE HYDROCHLORIDE 2.5 MG/ML
INJECTION, SOLUTION EPIDURAL; INFILTRATION; INTRACAUDAL
Status: DISCONTINUED | OUTPATIENT
Start: 2024-09-18 | End: 2024-09-18 | Stop reason: HOSPADM

## 2024-09-18 RX ORDER — ATORVASTATIN CALCIUM 40 MG/1
40 TABLET, FILM COATED ORAL DAILY
Status: DISCONTINUED | OUTPATIENT
Start: 2024-09-19 | End: 2024-09-20 | Stop reason: HOSPADM

## 2024-09-18 RX ORDER — DEXMEDETOMIDINE HYDROCHLORIDE 100 UG/ML
INJECTION, SOLUTION INTRAVENOUS
Status: DISCONTINUED | OUTPATIENT
Start: 2024-09-18 | End: 2024-09-18

## 2024-09-18 RX ORDER — HYDROMORPHONE HYDROCHLORIDE 1 MG/ML
INJECTION, SOLUTION INTRAMUSCULAR; INTRAVENOUS; SUBCUTANEOUS
Status: DISCONTINUED | OUTPATIENT
Start: 2024-09-18 | End: 2024-09-18

## 2024-09-18 RX ORDER — HEPARIN SODIUM 5000 [USP'U]/ML
5000 INJECTION, SOLUTION INTRAVENOUS; SUBCUTANEOUS
Status: COMPLETED | OUTPATIENT
Start: 2024-09-18 | End: 2024-09-18

## 2024-09-18 RX ORDER — INSULIN ASPART 100 [IU]/ML
1-10 INJECTION, SOLUTION INTRAVENOUS; SUBCUTANEOUS
Status: DISCONTINUED | OUTPATIENT
Start: 2024-09-18 | End: 2024-09-20 | Stop reason: HOSPADM

## 2024-09-18 RX ORDER — ONDANSETRON HYDROCHLORIDE 2 MG/ML
4 INJECTION, SOLUTION INTRAVENOUS DAILY PRN
Status: DISCONTINUED | OUTPATIENT
Start: 2024-09-18 | End: 2024-09-18

## 2024-09-18 RX ORDER — CLONIDINE 0.1 MG/24H
1 PATCH, EXTENDED RELEASE TRANSDERMAL ONCE AS NEEDED
Status: DISCONTINUED | OUTPATIENT
Start: 2024-09-18 | End: 2024-09-20 | Stop reason: HOSPADM

## 2024-09-18 RX ORDER — GLUCAGON 1 MG
1 KIT INJECTION
Status: DISCONTINUED | OUTPATIENT
Start: 2024-09-18 | End: 2024-09-18

## 2024-09-18 RX ORDER — MIDAZOLAM HYDROCHLORIDE 2 MG/2ML
2 INJECTION, SOLUTION INTRAMUSCULAR; INTRAVENOUS ONCE
Status: COMPLETED | OUTPATIENT
Start: 2024-09-18 | End: 2024-09-18

## 2024-09-18 RX ORDER — IBUPROFEN 200 MG
16 TABLET ORAL
Status: DISCONTINUED | OUTPATIENT
Start: 2024-09-18 | End: 2024-09-20 | Stop reason: HOSPADM

## 2024-09-18 RX ORDER — ONDANSETRON 4 MG/1
4 TABLET, ORALLY DISINTEGRATING ORAL EVERY 6 HOURS PRN
Status: DISCONTINUED | OUTPATIENT
Start: 2024-09-19 | End: 2024-09-20 | Stop reason: HOSPADM

## 2024-09-18 RX ORDER — PANTOPRAZOLE SODIUM 40 MG/1
40 TABLET, DELAYED RELEASE ORAL DAILY
Qty: 30 TABLET | Refills: 0 | Status: SHIPPED | OUTPATIENT
Start: 2024-09-19 | End: 2024-10-19

## 2024-09-18 RX ORDER — KETAMINE HCL IN 0.9 % NACL 50 MG/5 ML
SYRINGE (ML) INTRAVENOUS
Status: DISCONTINUED | OUTPATIENT
Start: 2024-09-18 | End: 2024-09-18

## 2024-09-18 RX ADMIN — ACETAMINOPHEN 1000 MG: 10 INJECTION, SOLUTION INTRAVENOUS at 11:09

## 2024-09-18 RX ADMIN — DEXMEDETOMIDINE 10 MCG: 200 INJECTION, SOLUTION INTRAVENOUS at 07:09

## 2024-09-18 RX ADMIN — CEFAZOLIN 2 G: 330 INJECTION, POWDER, FOR SOLUTION INTRAMUSCULAR; INTRAVENOUS at 07:09

## 2024-09-18 RX ADMIN — SODIUM CHLORIDE, POTASSIUM CHLORIDE, SODIUM LACTATE AND CALCIUM CHLORIDE: 600; 310; 30; 20 INJECTION, SOLUTION INTRAVENOUS at 07:09

## 2024-09-18 RX ADMIN — ONDANSETRON 4 MG: 4 TABLET, ORALLY DISINTEGRATING ORAL at 11:09

## 2024-09-18 RX ADMIN — ONDANSETRON 4 MG: 2 INJECTION INTRAMUSCULAR; INTRAVENOUS at 07:09

## 2024-09-18 RX ADMIN — ACETAMINOPHEN 1000 MG: 500 TABLET, FILM COATED ORAL at 06:09

## 2024-09-18 RX ADMIN — GLYCOPYRROLATE 0.2 MG: 0.2 INJECTION INTRAMUSCULAR; INTRAVENOUS at 07:09

## 2024-09-18 RX ADMIN — LIDOCAINE HYDROCHLORIDE 100 MG: 20 INJECTION INTRAVENOUS at 07:09

## 2024-09-18 RX ADMIN — ACETAMINOPHEN 1000 MG: 10 INJECTION, SOLUTION INTRAVENOUS at 01:09

## 2024-09-18 RX ADMIN — PHENYLEPHRINE HYDROCHLORIDE 100 MCG: 10 INJECTION INTRAVENOUS at 08:09

## 2024-09-18 RX ADMIN — Medication 15 MG: at 07:09

## 2024-09-18 RX ADMIN — PHENYLEPHRINE HYDROCHLORIDE 100 MCG: 10 INJECTION INTRAVENOUS at 07:09

## 2024-09-18 RX ADMIN — ROCURONIUM BROMIDE 50 MG: 10 INJECTION INTRAVENOUS at 07:09

## 2024-09-18 RX ADMIN — GABAPENTIN 600 MG: 300 CAPSULE ORAL at 06:09

## 2024-09-18 RX ADMIN — Medication 10 MG: at 08:09

## 2024-09-18 RX ADMIN — TRAMADOL HYDROCHLORIDE 100 MG: 50 TABLET, COATED ORAL at 11:09

## 2024-09-18 RX ADMIN — PROCHLORPERAZINE EDISYLATE 5 MG: 5 INJECTION INTRAMUSCULAR; INTRAVENOUS at 05:09

## 2024-09-18 RX ADMIN — SUGAMMADEX 200 MG: 100 INJECTION, SOLUTION INTRAVENOUS at 08:09

## 2024-09-18 RX ADMIN — MIDAZOLAM HYDROCHLORIDE 2 MG: 1 INJECTION, SOLUTION INTRAMUSCULAR; INTRAVENOUS at 07:09

## 2024-09-18 RX ADMIN — Medication 25 MG: at 07:09

## 2024-09-18 RX ADMIN — FENTANYL CITRATE 100 MCG: 50 INJECTION INTRAMUSCULAR; INTRAVENOUS at 07:09

## 2024-09-18 RX ADMIN — METHOCARBAMOL 1000 MG: 100 INJECTION INTRAMUSCULAR; INTRAVENOUS at 09:09

## 2024-09-18 RX ADMIN — DEXAMETHASONE SODIUM PHOSPHATE 4 MG: 4 INJECTION, SOLUTION INTRA-ARTICULAR; INTRALESIONAL; INTRAMUSCULAR; INTRAVENOUS; SOFT TISSUE at 07:09

## 2024-09-18 RX ADMIN — HYDROMORPHONE HYDROCHLORIDE 0.5 MG: 1 INJECTION, SOLUTION INTRAMUSCULAR; INTRAVENOUS; SUBCUTANEOUS at 08:09

## 2024-09-18 RX ADMIN — INSULIN ASPART 2 UNITS: 100 INJECTION, SOLUTION INTRAVENOUS; SUBCUTANEOUS at 12:09

## 2024-09-18 RX ADMIN — HEPARIN SODIUM 5000 UNITS: 5000 INJECTION, SOLUTION INTRAVENOUS; SUBCUTANEOUS at 06:09

## 2024-09-18 RX ADMIN — EPHEDRINE SULFATE 15 MG: 50 INJECTION INTRAVENOUS at 07:09

## 2024-09-18 RX ADMIN — PROPOFOL 200 MG: 10 INJECTION, EMULSION INTRAVENOUS at 07:09

## 2024-09-18 RX ADMIN — ROCURONIUM BROMIDE 15 MG: 10 INJECTION INTRAVENOUS at 07:09

## 2024-09-18 RX ADMIN — ONDANSETRON 4 MG: 4 TABLET, ORALLY DISINTEGRATING ORAL at 06:09

## 2024-09-18 RX ADMIN — SODIUM CHLORIDE, POTASSIUM CHLORIDE, SODIUM LACTATE AND CALCIUM CHLORIDE: 600; 310; 30; 20 INJECTION, SOLUTION INTRAVENOUS at 09:09

## 2024-09-18 NOTE — INTERVAL H&P NOTE
The patient has been examined and the H&P has been reviewed:    I concur with the findings and no changes have occurred since H&P was written.    Surgery risks, benefits and alternative options discussed and understood by patient/family.    Patient has been NPO since midnight.     Patient has held home blood thinners for appropriate amount of time: N/A    Positioning: Supine    Pre-operative Heparin Ordered: Yes    Pre-operative Antibiotics Ordered: Yes: ancef    Laterality Marked: N/A    All questions and concerns addressed. Patient confirms the procedure to be performed: GASTRECTOMY, SLEEVE, LAPAROSCOPIC.     Bailey Knight MD  [employer] General Surgery, PGY-1  09/18/2024 6:34 AM        Active Hospital Problems    Diagnosis  POA    *Morbid obesity with BMI of 45.0-49.9, adult [E66.01, Z68.42]  Not Applicable      Resolved Hospital Problems   No resolved problems to display.

## 2024-09-18 NOTE — PLAN OF CARE
09/18/24 1458   Discharge Assessment   Assessment Type Discharge Planning Assessment   Confirmed/corrected address, phone number and insurance Yes   Confirmed Demographics Correct on Facesheet   Source of Information family   When was your last doctors appointment?   (PCP: No Ag)   Does patient/caregiver understand observation status   (Inpatient)   Communicated JUD with patient/caregiver Date not available/Unable to determine   Reason For Admission Morbid obesity; gastric sleeve surgery   People in Home child(justyna), dependent   Facility Arrived From: Home   Do you expect to return to your current living situation? Yes   Do you have help at home or someone to help you manage your care at home? Yes   Who are your caregiver(s) and their phone number(s)? Isaura Edgar, sister, P: [phone]   Prior to hospitilization cognitive status: Alert/Oriented;No Deficits   Current cognitive status: No Deficits;Alert/Oriented   Walking or Climbing Stairs Difficulty no   Dressing/Bathing Difficulty no   Home Accessibility stairs to enter home   Number of Stairs, Main Entrance other (see comments)  (20)   Stair Railings, Main Entrance railings safe and in good condition;railings on both sides of stairs   Home Layout Bathroom on 2nd floor;Bedroom on 2nd floor   Equipment Currently Used at Home none   Readmission within 30 days? No   Patient currently being followed by outpatient case management? No   Do you currently have service(s) that help you manage your care at home? No   Do you take prescription medications? Yes  ([facility] in [place])   Do you have prescription coverage? Yes   Coverage [facility] Medicaid   Do you have any problems affording any of your prescribed medications? No   Is the patient taking medications as prescribed? yes   Who is going to help you get home at discharge? Family   How do you get to doctors appointments? family or friend will provide;car, drives self   Are you on dialysis? No   Do you take  coumadin? No   Discharge Plan A Home with family   DME Needed Upon Discharge  none   Discharge Plan discussed with: Sibling;Patient   Name(s) and Number(s) Isaura Edgar, sister, P: [phone]   Transition of Care Barriers None   Physical Activity   On average, how many days per week do you engage in moderate to strenuous exercise (like a brisk walk)? 3 days   On average, how many minutes do you engage in exercise at this level? 20 min   Financial Resource Strain   How hard is it for you to pay for the very basics like food, housing, medical care, and heating? Not very   Housing Stability   In the last 12 months, was there a time when you were not able to pay the mortgage or rent on time? N   At any time in the past 12 months, were you homeless or living in a shelter (including now)? N   Transportation Needs   Has the lack of transportation kept you from medical appointments, meetings, work or from getting things needed for daily living? No   Food Insecurity   Within the past 12 months, you worried that your food would run out before you got the money to buy more. Never true   Within the past 12 months, the food you bought just didn't last and you didn't have money to get more. Never true   Stress   Do you feel stress - tense, restless, nervous, or anxious, or unable to sleep at night because your mind is troubled all the time - these days? Only a littl   Social Isolation   How often do you feel lonely or isolated from those around you?  Never   Alcohol Use   Q1: How often do you have a drink containing alcohol? Pt Unable   Q2: How many drinks containing alcohol do you have on a typical day when you are drinking? Pt Unable   Q3: How often do you have six or more drinks on one occasion? Pt Unable   Utilities   In the past 12 months has the electric, gas, oil, or water company threatened to shut off services in your home? No   Health Literacy   How often do you need to have someone help you when you read  instructions, pamphlets, or other written material from your doctor or pharmacy? Rarely   OTHER   Name(s) of People in Home 13 year old son     DC assessment information obtained from patient's sister, Isaura, as patient was sedated from surgery.

## 2024-09-18 NOTE — ANESTHESIA PROCEDURE NOTES
Intubation    Date/Time: 9/18/2024 7:11 AM    Performed by: Stone Lino CRNA  Authorized by: Yulissa Iqbal MD    Intubation:     Induction:  Intravenous    Intubated:  Postinduction    Mask Ventilation:  Easy with oral airway    Attempts:  1    Attempted By:  Student (NIKIA Atkinson)    Method of Intubation:  Direct    Blade:  Yunior 4    Laryngeal View Grade: Grade I - full view of cords      Difficult Airway Encountered?: No      Complications:  None    Airway Device:  Oral endotracheal tube    Airway Device Size:  7.5    Style/Cuff Inflation:  Cuffed    Inflation Amount (mL):  6    Tube secured:  21    Secured at:  The lips    Placement Verified By:  Capnometry    Complicating Factors:  None    Findings Post-Intubation:  BS equal bilateral and atraumatic/condition of teeth unchanged

## 2024-09-18 NOTE — LETTER
September 20, 2024         [address] [place] [address]  Phone: [phone]  Fax: [phone]       Date of Visit: 09/20/2024    To Whom It May Concern:    Please be advised that under state and federal laws as it relates to patient privacy and Health Insurance Accountability Act (HIPAA), we can not release our patient(s) name without authorization. Although, we can confirm that the individual listed below did accompany a person to our facility for healthcare services to be provided.    This document confirms that Karthikeyan Stock accompanied a patient to our facility from 9/18/2024 to 09/20/2024.    Sincerely,     Sasha Rico RN

## 2024-09-18 NOTE — TRANSFER OF CARE
Anesthesia Transfer of Care Note    Patient: Sonia Stock    Procedure(s) Performed: Procedure(s) (LRB):  GASTRECTOMY, SLEEVE, LAPAROSCOPIC (N/A)    Patient location: PACU    Anesthesia Type: general    Transport from OR: Transported from OR on room air with adequate spontaneous ventilation    Post pain: adequate analgesia    Post assessment: no apparent anesthetic complications and tolerated procedure well    Post vital signs: stable    Level of consciousness: sedated    Nausea/Vomiting: no nausea/vomiting    Complications: none    Transfer of care protocol was followed      Last vitals: Visit Vitals  /78   Pulse 72   Temp 36.3 °C (97.3 °F) (Temporal)   Resp 20   Wt 127.2 kg (280 lb 6.4 oz)   LMP 09/01/2024 (Approximate)   SpO2 99%   Breastfeeding No   BMI 46.66 kg/m²

## 2024-09-18 NOTE — PROGRESS NOTES
RN requested IV pain medication in case patient has breakthrough pain    Recc:  Cont w current orders  Update surgeon for breakthrough pain

## 2024-09-18 NOTE — ANESTHESIA POSTPROCEDURE EVALUATION
Anesthesia Post Evaluation    Patient: Sonia Stock    Procedure(s) Performed: Procedure(s) (LRB):  GASTRECTOMY, SLEEVE, LAPAROSCOPIC (N/A)    Final Anesthesia Type: general      Patient location during evaluation: med/surg floor  Post-procedure vital signs: reviewed and stable  Airway patency: patent      Anesthetic complications: no      Cardiovascular status: hemodynamically stable  Respiratory status: spontaneous ventilation  Follow-up not needed.              Vitals Value Taken Time   /73 09/18/24 1002   Temp 36.1 °C (97 °F) 09/18/24 0859   Pulse 71 09/18/24 1247   Resp 18 09/18/24 1123   SpO2 99 % 09/18/24 1247         Event Time   Out of Recovery 09:38:00         Pain/Dory Score: Pain Rating Prior to Med Admin: 10 (9/18/2024 11:23 AM)  Dory Score: 9 (9/18/2024  9:35 AM)

## 2024-09-18 NOTE — BRIEF OP NOTE
[facility] - Periop Services  Brief Operative Note    Surgery Date: 9/18/2024     Surgeons and Role:     * Eric Al MD -      * Bailey Knight MD - Resident - Assisting    Private assist: SUBHASH Juárez    Pre-op Diagnosis: Morbid obesity    Post-op Diagnosis:  Post-Op Diagnosis Codes:     * Morbid obesity [E66.01]    Procedure(s) (LRB):  GASTRECTOMY, SLEEVE, LAPAROSCOPIC (N/A)    Anesthesia: General    Operative Findings: dictated per surgeon     Estimated Blood Loss: 20 cc         Specimens:   Specimen (24h ago, onward)       Start     Ordered    09/18/24 0805  Specimen to Pathology  RELEASE UPON ORDERING        References:    Click here for ordering Quick Tip   Question:  Release to patient  Answer:  Immediate    09/18/24 0805

## 2024-09-18 NOTE — OP NOTE
[facility] - Periop Services  Surgery Department  Operative Note    SUMMARY     Date of Procedure: 9/18/2024     Procedure: Procedure(s) (LRB):  GASTRECTOMY, SLEEVE, LAPAROSCOPIC (N/A)     Surgeons and Role:     * Eric Al MD - Primary     * Bailey Knight MD - Resident - Assisting        Pre-Operative Diagnosis: * No pre-op diagnosis entered *    Post-Operative Diagnosis: Post-Op Diagnosis Codes:     * Morbid obesity [E66.01]    Anesthesia: General    Operative Findings (including complications, if any):  Fatty liver    Indication: Morbid Obesity and other co-morbid conditions.  Assistant at surgery required due to anatomical considerations.    Description of Technical Procedures: Patient was taken to the operating room. After the induction of anesthesia the abdomen was prepped and draped in the usual sterile fashion. An optical trocar was used to access the peritoneum. Pneumoperitoneum was completed under direct vision. Additional working ports were placed under direct vision. A liver retractor was placed. The patient had no hiatal hernia defect.   The pylorus was identified and 4 cm proximal to the pylorus the greater curvature of the stomach was mobilized with Harmoinc scapel in the  direction of  the fundus and angle of His. The GE junction was identified the left jory was identified.  A 34F blunt-tip bougie was placed  in the stomach under direct vision towards the pylorus along the lesser curve Sequential firings of green and blue linear cutter stapler were used to create the antral pouch and the sleeve portion sleeve gastrectomy. Attention was paid to not encroach upon the angular incisura, or the GE junction. The staple line was intact and hemostatic. Fibrin glue product was used on the staple line the bougie was removed  the liver retractor was removed under direct vision specimen was removed fascia closed 0 Vicryl skin closed 4-0 Vicryl patient tolerated procedure well. The assistant at  surgery was required to manipulate tissue and organs to facilitate safe surgery.      Estimated Blood Loss (EBL): * No values recorded between 9/18/2024  7:38 AM and 9/18/2024  8:59 AM *             Specimens: Product of sleeve gastrectromy            Condition: Good    Disposition: PACU - hemodynamically stable.    Attestation: I was present and scrubbed for the entire procedure.

## 2024-09-19 LAB
ABORH RETYPE: NORMAL
ANION GAP SERPL CALC-SCNC: 8 MEQ/L
BASOPHILS # BLD AUTO: 0.02 X10(3)/MCL
BASOPHILS NFR BLD AUTO: 0.2 %
BUN SERPL-MCNC: 7.9 MG/DL (ref 7–18.7)
CALCIUM SERPL-MCNC: 9.2 MG/DL (ref 8.4–10.2)
CHLORIDE SERPL-SCNC: 104 MMOL/L (ref 98–107)
CO2 SERPL-SCNC: 26 MMOL/L (ref 22–29)
CREAT SERPL-MCNC: 0.85 MG/DL (ref 0.55–1.02)
CREAT/UREA NIT SERPL: 9
EOSINOPHIL # BLD AUTO: 0 X10(3)/MCL (ref 0–0.9)
EOSINOPHIL NFR BLD AUTO: 0 %
ERYTHROCYTE [DISTWIDTH] IN BLOOD BY AUTOMATED COUNT: 12.8 % (ref 11.5–17)
GFR SERPLBLD CREATININE-BSD FMLA CKD-EPI: >60 ML/MIN/1.73/M2
GLUCOSE SERPL-MCNC: 117 MG/DL (ref 74–100)
HCT VFR BLD AUTO: 36.3 % (ref 37–47)
HGB BLD-MCNC: 11.8 G/DL (ref 12–16)
IMM GRANULOCYTES # BLD AUTO: 0.06 X10(3)/MCL (ref 0–0.04)
IMM GRANULOCYTES NFR BLD AUTO: 0.5 %
LYMPHOCYTES # BLD AUTO: 1.9 X10(3)/MCL (ref 0.6–4.6)
LYMPHOCYTES NFR BLD AUTO: 14.7 %
MCH RBC QN AUTO: 28 PG (ref 27–31)
MCHC RBC AUTO-ENTMCNC: 32.5 G/DL (ref 33–36)
MCV RBC AUTO: 86.2 FL (ref 80–94)
MONOCYTES # BLD AUTO: 0.95 X10(3)/MCL (ref 0.1–1.3)
MONOCYTES NFR BLD AUTO: 7.3 %
NEUTROPHILS # BLD AUTO: 10.01 X10(3)/MCL (ref 2.1–9.2)
NEUTROPHILS NFR BLD AUTO: 77.3 %
NRBC BLD AUTO-RTO: 0 %
PLATELET # BLD AUTO: 317 X10(3)/MCL (ref 130–400)
PMV BLD AUTO: 8.3 FL (ref 7.4–10.4)
POCT GLUCOSE: 100 MG/DL (ref 70–110)
POCT GLUCOSE: 106 MG/DL (ref 70–110)
POCT GLUCOSE: 121 MG/DL (ref 70–110)
POCT GLUCOSE: 126 MG/DL (ref 70–110)
POTASSIUM SERPL-SCNC: 3.5 MMOL/L (ref 3.5–5.1)
RBC # BLD AUTO: 4.21 X10(6)/MCL (ref 4.2–5.4)
SODIUM SERPL-SCNC: 138 MMOL/L (ref 136–145)
WBC # BLD AUTO: 12.94 X10(3)/MCL (ref 4.5–11.5)

## 2024-09-19 PROCEDURE — 80048 BASIC METABOLIC PNL TOTAL CA: CPT | Performed by: NURSE PRACTITIONER

## 2024-09-19 PROCEDURE — 63600175 PHARM REV CODE 636 W HCPCS: Performed by: NURSE PRACTITIONER

## 2024-09-19 PROCEDURE — 11000001 HC ACUTE MED/SURG PRIVATE ROOM

## 2024-09-19 PROCEDURE — 25000003 PHARM REV CODE 250: Performed by: NURSE PRACTITIONER

## 2024-09-19 PROCEDURE — 36415 COLL VENOUS BLD VENIPUNCTURE: CPT | Performed by: NURSE PRACTITIONER

## 2024-09-19 PROCEDURE — 85025 COMPLETE CBC W/AUTO DIFF WBC: CPT | Performed by: NURSE PRACTITIONER

## 2024-09-19 RX ADMIN — ONDANSETRON 4 MG: 4 TABLET, ORALLY DISINTEGRATING ORAL at 10:09

## 2024-09-19 RX ADMIN — SODIUM CHLORIDE, POTASSIUM CHLORIDE, SODIUM LACTATE AND CALCIUM CHLORIDE: 600; 310; 30; 20 INJECTION, SOLUTION INTRAVENOUS at 09:09

## 2024-09-19 RX ADMIN — PANTOPRAZOLE SODIUM 40 MG: 40 TABLET, DELAYED RELEASE ORAL at 08:09

## 2024-09-19 RX ADMIN — SODIUM CHLORIDE, POTASSIUM CHLORIDE, SODIUM LACTATE AND CALCIUM CHLORIDE: 600; 310; 30; 20 INJECTION, SOLUTION INTRAVENOUS at 05:09

## 2024-09-19 RX ADMIN — SODIUM CHLORIDE, POTASSIUM CHLORIDE, SODIUM LACTATE AND CALCIUM CHLORIDE: 600; 310; 30; 20 INJECTION, SOLUTION INTRAVENOUS at 01:09

## 2024-09-19 RX ADMIN — HEPARIN SODIUM 5000 UNITS: 5000 INJECTION, SOLUTION INTRAVENOUS; SUBCUTANEOUS at 08:09

## 2024-09-19 RX ADMIN — SODIUM CHLORIDE, POTASSIUM CHLORIDE, SODIUM LACTATE AND CALCIUM CHLORIDE: 600; 310; 30; 20 INJECTION, SOLUTION INTRAVENOUS at 12:09

## 2024-09-19 RX ADMIN — ATORVASTATIN CALCIUM 40 MG: 40 TABLET, FILM COATED ORAL at 08:09

## 2024-09-19 RX ADMIN — ACETAMINOPHEN 1000 MG: 500 TABLET ORAL at 09:09

## 2024-09-19 RX ADMIN — PROCHLORPERAZINE EDISYLATE 5 MG: 5 INJECTION INTRAMUSCULAR; INTRAVENOUS at 10:09

## 2024-09-19 RX ADMIN — ACETAMINOPHEN 1000 MG: 500 TABLET ORAL at 02:09

## 2024-09-19 RX ADMIN — HYOSCYAMINE SULFATE 0.12 MG: 0.12 TABLET SUBLINGUAL at 08:09

## 2024-09-19 RX ADMIN — ACETAMINOPHEN 1000 MG: 10 INJECTION, SOLUTION INTRAVENOUS at 05:09

## 2024-09-19 NOTE — PLAN OF CARE
Problem: Adult Inpatient Plan of Care  Goal: Optimal Comfort and Wellbeing  Outcome: Progressing     Problem: Adult Inpatient Plan of Care  Goal: Plan of Care Review  Outcome: Progressing

## 2024-09-19 NOTE — PROGRESS NOTES
[facility]  [address] [facility] [address]  Phone: [phone]  Fax: [phone]    [facility] Progress Note  POD# 1 S/P Lap Sleeve Gastrectomy  Dr. Eric Al  Patient Name: Sonia Stock  YOB: 1985  Author: Gunjan Martinez, ANP     Date: 09/19/2024      Admit and Discharge Diagnosis:   Morbid Obesity, BMI 46.66    POD# 1 S/P Laparoscopic Vertical Sleeve Gastrectomy per Dr. Eric Al     [facility] Course:  Ms. Sonia Stock is a 38 y.o. female who was admitted for an elective bariatric procedure. Procedure performed as stated above. Upon completion of procedure, pt was transferred from the recovery room to the post surgical floor for further care and treatment.   POD#1, afebrile, vital signs stable. The pt's diet was advanced to bariatric clear liquids this AM, she hasn't started her clear liquids yet.     Review of Systems: Mild incisional pain reported but tolerable. Patient c/o abdominal  cramping and spasms to epigastric abdomen.  Patient c/o nausea this AM. C/o retching and vomiting x 4 last night but none this AM. Patient ambulating in the room/hallway and voiding without difficulty. Pt denies any dizziness, palpitations, SOB, or CP. All other review of systems are negative.     Physical Examination:   Vital signs: stable, noted in chart  General: Awake and alert, able to answer all questions. Resting in bed with family member at bedside  Respiratory:  Clear to auscultation bilaterally  Cardio: Regular rate and rhythm.  Abdomen: Soft, non distended. Bowel sounds present to all 4 quadrants. Lap sites clean, dry, and intact. Appropriate point tenderness to lap sites. Abdomen benign.   Neuro: Alert and oriented x's 4.    Post-Op Info:  Procedure(s) (LRB):  GASTRECTOMY, SLEEVE, LAPAROSCOPIC (N/A)   1 Day Post-Op      Medications:  Continuous Infusions:   lactated ringers   Intravenous Continuous 125  mL/hr at 09/18/24 0701 New Bag at 09/18/24 0704    lactated ringers   Intravenous Continuous 125 mL/hr at 09/19/24 0505 New Bag at 09/19/24 0505    lactated ringers   Intravenous Continuous         Scheduled Meds:   acetaminophen  1,000 mg Oral Q8H    atorvastatin  40 mg Oral Daily    heparin (porcine)  5,000 Units Subcutaneous Daily    pantoprazole  40 mg Oral Daily     PRN Meds:  Current Facility-Administered Medications:     cloNIDine 0.1 mg/24 hr td ptwk, 1 patch, Transdermal, Once PRN    dextrose 10%, 12.5 g, Intravenous, PRN    dextrose 10%, 25 g, Intravenous, PRN    glucagon (human recombinant), 1 mg, Intramuscular, PRN    glucose, 16 g, Oral, PRN    glucose, 24 g, Oral, PRN    hydrALAZINE, 10 mg, Intravenous, Q6H PRN    hyoscyamine, 0.125 mg, Sublingual, Q4H PRN    insulin aspart U-100, 1-10 Units, Subcutaneous, QID (AC + HS) PRN    labetalol, 10 mg, Intravenous, Q2H PRN    ondansetron, 4 mg, Oral, Q6H PRN    prochlorperazine, 5 mg, Intravenous, Q6H PRN    promethazine, 25 mg, Oral, Q6H PRN    sodium chloride 0.9%, 10 mL, Intravenous, PRN    traMADoL, 100 mg, Oral, Q6H PRN     Objective:     Vital Signs (Most Recent):  Temp: 98.8 °F (37.1 °C) (09/19/24 0733)  Pulse: 62 (09/19/24 0733)  Resp: 18 (09/19/24 0733)  BP: 134/84 (09/19/24 0733)  SpO2: 99 % (09/19/24 0744) Vital Signs (24h Range):  Temp:  [97 °F (36.1 °C)-99.3 °F (37.4 °C)] 98.8 °F (37.1 °C)  Pulse:  [52-77] 62  Resp:  [18-20] 18  SpO2:  [94 %-100 %] 99 %  BP: (100-139)/(61-84) 134/84       Intake/Output Summary (Last 24 hours) at 9/19/2024 0827  Last data filed at 9/19/2024 0017  Gross per 24 hour   Intake 1048.46 ml   Output 550 ml   Net 498.46 ml       Significant Labs:  BMP:   Recent Labs   Lab 09/19/24  0452      K 3.5      CO2 26   BUN 7.9   CREATININE 0.85   CALCIUM 9.2     CBC:   Recent Labs   Lab 09/19/24  0451   WBC 12.94*   RBC 4.21   HGB 11.8*   HCT 36.3*      MCV 86.2   MCH 28.0   MCHC 32.5*       Significant  Diagnostics:  None    Assessment/Plan:     Active Diagnoses:    Diagnosis Date Noted POA    PRINCIPAL PROBLEM:  Morbid obesity with BMI of 45.0-49.9, adult [E66.01, Z68.42] 11/17/2023 Not Applicable      Problems Resolved During this Admission:   Labs:  Unremarkable, reviewed.    Impression and Plan:     Pt POD#1 status post Lap sleeve gastrectomy is progressing well per bariatric protocol.   Will continue to monitor progress, if pt able to tolerate PO, will consider DC later. If not, keep additional night for OBS.     - Continue ambulation in perez  - Continue IS  - Continue IV fluids  - Resume home meds as appropriate    Gunjan Martinez, ANP  General Surgery  [facility]

## 2024-09-19 NOTE — PROGRESS NOTES
Date of education: 9/19/24  Pt education type: []Pre op  [x]Post op  Surgery date: 9/18/24  Type of surgery: sleeve gastrectomy     Education was provided on:   [x]Importance of protein and vitamin protocol  [x]Importance of drinking  fl oz/day of non carbonated sugar free non caffeinated beverages  [x]Importance of following dietary protocol  [x]Importance of ambulation postop   [x]Use of incentive spirometer 10 times an hour while awake  [x]Non opiod pain management post op   [x]Discontinuing use of meds containing aspirin, ibuprofen, NSAIDs, post op  [x]Signs and symptoms of immediate and long term complications post-op  [x]Prevention and signs and symptoms of blood clots   [x]Prevention and signs of infection  [x]Reviewed medication regimen  [x]Importance of adhering to behavioral changes  [x]Importance of following exercise protocol      Barriers to learning:  [x]None evident  []Acuity of illness  []Cognitive defects  []Cultural barriers  []Desire/Motivation  []Difficulty concentrating  []Emotional state  []Financial concerns  []Hearing deficit  []Language barrier  []Literacy  []Memory problems  []Vision impairment     Teaching methods:  []Demonstration  [x]Explanation  []Printed materials  [x]Teach back  []Virtual/web based    Expected Compliance:  [x]Good  []Fair  []Poor    Additional Notes:  Reviewed above protocols with patient. She was able to recite post-op protocols with minimal assistance. Reviewed the importance of following the post-op dietary and behavioral protocols to prevent complications.

## 2024-09-19 NOTE — CONSULTS
"  [facility] - [facility] S[facility] Tele[facility]  Adult Nutrition  Consult Note    SUMMARY     Recommendations    Recommendation/Intervention: Follow post-bariatric surgery protocol for nutrition, vitamins, protein, hydration, medications, and exercise  Goals: Tolerate and progress diet as instructed by bariatric team.  Nutrition Goal Status: progressing towards goal  Communication of RD Recs: discussed on rounds    Assessment and Plan    No new Assessment & Plan notes have been filed under this hospital service since the last note was generated.  Service: Nutrition     Malnutrition Assessment  Malnutrition Level: other (see comments) (Pt does not meet criteria for malnutrition.)    Reason for Assessment    Reason For Assessment: consult  Diagnosis: other (see comments) (s/p VSG)  Relevant Medical History: Obesity, HTN  Interdisciplinary Rounds: attended  General Information Comments: Educated pt on post-bariatric surgery protocol for nutrition, vitamins, protein, hydration, medications, and exercise  Nutrition Discharge Planning: Follow protocol as instructed by bariatric team.    Nutrition Risk Screen    Nutrition Risk Screen: no indicators present    Nutrition/Diet History    Spiritual, Cultural Beliefs, Spiritism Practices, Values that Affect Care: no  Factors Affecting Nutritional Intake: decreased appetite, dry mouth, early satiety    Anthropometrics    Temp: 98.5 °F (36.9 °C)  Height Method: Stated  Height: 5' 5" (165.1 cm)  Height (inches): 65 in  Weight Method: Standard Scale  Weight: 127.2 kg (280 lb 6.4 oz)  Weight (lb): 280.4 lb  Ideal Body Weight (IBW), Female: 125 lb  % Ideal Body Weight, Female (lb): 224.32 %  BMI (Calculated): 46.7    Lab/Procedures/Meds    Pertinent Labs Reviewed: reviewed  Pertinent Medications Reviewed: reviewed    Estimated/Assessed Needs    Weight Used For Calorie Calculations: 71 kg (156 lb 8.4 oz)  Energy Calorie Requirements (kcal): 3532-8235  Energy Need Method: " Kcal/kg  Protein Requirements: 71-78  Weight Used For Protein Calculations: 71 kg (156 lb 8.4 oz)  Fluid Requirements (mL): 2544  Estimated Fluid Requirement Method: RDA Method  RDA Method (mL): 1420    Nutrition Prescription Ordered    Current Diet Order: Bariatric Clear Liquid Diet  Nutrition Order Comments: Progress diet as instructed by bariatric team.    Evaluation of Received Nutrient/Fluid Intake    Energy Calories Required: not meeting needs (as expected)  Protein Required: not meeting needs (as expected)  Fluid Required: meeting needs  % Intake of Estimated Energy Needs: 0 - 25 %  % Meal Intake: 0 - 25 %    Nutrition Risk    Level of Risk/Frequency of Follow-up: low     Monitor and Evaluation    Food and Nutrient Adminstration: diet order  Knowledge/Beliefs/Attitudes: food and nutrition knowledge/skill       Nutrition Follow-Up    RD will f/u with pt in 2 weeks to progress diet- on 10/4/2024

## 2024-09-19 NOTE — PROGRESS NOTES
[facility] - [facility] Surg Telemetry  General Surgery  Progress Note      Surgery interim note: POD# 1 Lap VSG    Subjective: Pt has less nausea, improved with Compazine. No emesis on day shift.  Tolerating water but limited PO intake.   Ambulated once on days.   Pain controlled.     Post-Op Info:  Procedure(s) (LRB):  GASTRECTOMY, SLEEVE, LAPAROSCOPIC (N/A)   1 Day Post-Op      Medications:  Continuous Infusions:   lactated ringers   Intravenous Continuous 125 mL/hr at 09/19/24 1308 New Bag at 09/19/24 1308    lactated ringers   Intravenous Continuous 125 mL/hr at 09/19/24 0505 New Bag at 09/19/24 0505    lactated ringers   Intravenous Continuous         Scheduled Meds:   acetaminophen  1,000 mg Oral Q8H    atorvastatin  40 mg Oral Daily    heparin (porcine)  5,000 Units Subcutaneous Daily    pantoprazole  40 mg Oral Daily     PRN Meds:  Current Facility-Administered Medications:     cloNIDine 0.1 mg/24 hr td ptwk, 1 patch, Transdermal, Once PRN    dextrose 10%, 12.5 g, Intravenous, PRN    dextrose 10%, 25 g, Intravenous, PRN    glucagon (human recombinant), 1 mg, Intramuscular, PRN    glucose, 16 g, Oral, PRN    glucose, 24 g, Oral, PRN    hydrALAZINE, 10 mg, Intravenous, Q6H PRN    hyoscyamine, 0.125 mg, Sublingual, Q4H PRN    insulin aspart U-100, 1-10 Units, Subcutaneous, QID (AC + HS) PRN    labetalol, 10 mg, Intravenous, Q2H PRN    ondansetron, 4 mg, Oral, Q6H PRN    prochlorperazine, 5 mg, Intravenous, Q6H PRN    promethazine, 25 mg, Oral, Q6H PRN    sodium chloride 0.9%, 10 mL, Intravenous, PRN    traMADoL, 100 mg, Oral, Q6H PRN     Objective:     Vital Signs (Most Recent):  Temp: 98.5 °F (36.9 °C) (09/19/24 1148)  Pulse: 62 (09/19/24 1148)  Resp: 18 (09/19/24 1148)  BP: 134/79 (09/19/24 1148)  SpO2: 98 % (09/19/24 1148) Vital Signs (24h Range):  Temp:  [98.5 °F (36.9 °C)-99.3 °F (37.4 °C)] 98.5 °F (36.9 °C)  Pulse:  [52-77] 62  Resp:  [18-20] 18  SpO2:  [95 %-99 %] 98 %  BP: (100-139)/(65-84)  134/79       Intake/Output Summary (Last 24 hours) at 9/19/2024 1405  Last data filed at 9/19/2024 0800  Gross per 24 hour   Intake 148.46 ml   Output 550 ml   Net -401.54 ml         Assessment/Plan:     Active Diagnoses:    Diagnosis Date Noted POA    PRINCIPAL PROBLEM:  Morbid obesity with BMI of 45.0-49.9, adult [E66.01, Z68.42] 11/17/2023 Not Applicable      Problems Resolved During this Admission:     Will keep an additional night for OBS due to limited PO intake  Continue IVF   Increase PO intake as able  Consider DC in AM    Gunjan Martinez, ANP  General Surgery  [facility]

## 2024-09-20 VITALS
HEIGHT: 65 IN | SYSTOLIC BLOOD PRESSURE: 106 MMHG | WEIGHT: 289.88 LBS | TEMPERATURE: 99 F | BODY MASS INDEX: 48.3 KG/M2 | RESPIRATION RATE: 18 BRPM | DIASTOLIC BLOOD PRESSURE: 65 MMHG | HEART RATE: 55 BPM | OXYGEN SATURATION: 97 %

## 2024-09-20 PROCEDURE — 25000003 PHARM REV CODE 250: Performed by: NURSE PRACTITIONER

## 2024-09-20 PROCEDURE — 94760 N-INVAS EAR/PLS OXIMETRY 1: CPT

## 2024-09-20 PROCEDURE — 63600175 PHARM REV CODE 636 W HCPCS: Performed by: NURSE PRACTITIONER

## 2024-09-20 RX ADMIN — PANTOPRAZOLE SODIUM 40 MG: 40 TABLET, DELAYED RELEASE ORAL at 08:09

## 2024-09-20 RX ADMIN — SODIUM CHLORIDE, POTASSIUM CHLORIDE, SODIUM LACTATE AND CALCIUM CHLORIDE: 600; 310; 30; 20 INJECTION, SOLUTION INTRAVENOUS at 05:09

## 2024-09-20 RX ADMIN — ACETAMINOPHEN 1000 MG: 500 TABLET ORAL at 01:09

## 2024-09-20 RX ADMIN — ATORVASTATIN CALCIUM 40 MG: 40 TABLET, FILM COATED ORAL at 08:09

## 2024-09-20 RX ADMIN — HEPARIN SODIUM 5000 UNITS: 5000 INJECTION, SOLUTION INTRAVENOUS; SUBCUTANEOUS at 08:09

## 2024-09-20 NOTE — PROGRESS NOTES
Pediatric Female Well Child/Adolescent Exam: 13-17 Years of age    Chief Complaint   Patient presents with    Well Adolescent       SUBJECTIVE:  Melly Ponce is a 17 year old female who presents for a well child exam.  Patient presents with mother.  She is planning to do soccer again through high school this year and does have a sports form that needs completion.  She has been doing well in school and ended last year with A's and B's.  She has one more year of high school and would like to go to [employer] to study film.  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    She does have her 's license and is doing well with driving.  She is not using any tobacco products or vaping.    She is restricting her eating - a piece of fruit for breakfast, a very small amount for lunch and often no dinner.  Her mother also had a restrictive eating pattern as a younger adult.  We discussed the dangers of this type of eating and the patient has reluctantly agreed to add small snacks through the day.  They would like counseling as she also has a lot of anxiety.  I did place a referral today.  They declined the need for a dietitian at this time.    CONCERNS RAISED TODAY: per nurses notes and as stated above    RISK ASSESSMENT (HEADSSS):   Home  Lives with: mother and father  []  YES    [x]  NO    []  Unknown    Changes in home/work environment?  [x]  YES    []  NO    []  Unknown    Eats meals with family?  [x]  YES    []  NO    []  Unknown    Has family member/adult to turn to for help?  [x]  YES    []  NO    []  Unknown    Is permitted and is able to make independent decisions?  Education  Grade in school:  11th grade  [x]  Normal    []  Delayed            Academic performance?  [x]  Normal    []  Delayed            Behavior/attention?  [x]  Normal    []  Delayed            Homework?  Eating  Calcium Source: Diet  []  YES    [x]  NO    []  Unknown    Eats regular meals including adequate fruits and vegetables?   "Monthly wt Check    Pt weighed in at 296.9#     Height:   Ht Readings from Last 1 Encounters:   11/07/23 5' 5" (1.651 m)      Weight:   Wt Readings from Last 3 Encounters:   04/26/24 1149 134.7 kg (296 lb 14.4 oz)   03/15/24 1106 132.8 kg (292 lb 11.2 oz)   02/22/24 1347 132.3 kg (291 lb 11.2 oz)      BMI:   BMI Readings from Last 1 Encounters:   04/26/24 49.41 kg/m²       Additional Information:    Pt presented today with a 4.9# wt. Gain. States she has been eating emotionally from stressful situations involving her significant other. Pt reported eating higher calorie and higher portions the past couple of weeks causing the wt. Gain. Discussed what pt could do when stressful situations occur in future instead of eating: she said she would watch a movie or go for a walk. 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 She will have membership to a gym there and plans to exercise/walk 3-4x weekly. We also looked up healthy meal prep locations where she can buy prepared meals. I gave her a meal guide for when eating out as well to help choose healthier options. Discussed with pt the importance of losing wt before sx and she said she understood and would get back on track.      Plan:  Prepare meals ahead of time- buy meal prep food at restaurant  Protein shake in morning for BF  Exercise 3-4x weekly at gym in [place]  " Restricts her eating  [x]  YES    []  NO    []  Unknown    Drinks non-sweetened liquids?  []  YES    [x]  NO    []  Unknown    Has concerns about body/appearance?  Wants to be taller and thin  Activities  [x]  YES    []  NO    []  Unknown    Has friends?  [x]  YES    []  NO    []  Unknown    Has at least 1 hr of physical activity per day?  [x]  YES    []  NO    []  Unknown    TV/Video time less than 2 hrs/day except for homework?  [x]  YES    []  NO    []  Unknown   Volunteers and participates in community activities?  [x]  YES    []  NO    []  Unknown    Involved in other activities (music, drama,etc)?  Drugs  []  YES    [x]  NO    []  Unknown    Uses tobacco, alcohol or drugs?  Safety  [x]  YES    []  NO    []  Unknown    Home is free of violence?  [x]  YES    []  NO    []  Unknown    Uses safety belts/safety equipment?  [x]  YES    []  NO    []  Unknown    Has peer relationships free of violence?  Sex  []  YES    [x]  NO    []  Unknown   Has had sex?  Suicide/Mental Health  [x]  YES    []  NO    []  Unknown   Has ways to cope with stress?  [x]  YES    []  NO    []  Unknown   Displays self confidence?  []  YES    [x]  NO    []  Unknown   Has problems with sleep?  [x]  YES    []  NO    []  Unknown   Gets depressed, anxious or irritable / has mood swings?  Would like a referral to behavioral health for counseling  []  YES    [x]  NO    []  Unknown    Has thoughts about hurting self or considering suicide?    VISION SCREENING:    No results found.    OBJECTIVE:  PAST HISTORIES:  Allergies, Medications, Medical HX, Surgical HX, Family HX reviewed and updated.  Toxic Exposure:   Tobacco Use: Never           IMMUNIZATION STATUS: Not up to date.  Needed immunizations include: Menveo booster.    IMMUNIZATION REACTIONS: None   VARICELLA STATUS: confirmed by vaccine administration    RECENT HEALTH EVENTS:  Illnesses: None.  Hospitalizations: None.  Injuries or Accidents: None.    REVIEW OF SYSTEMS:    All systems reviewed  and negative except as documented in \"Concerns raised\".    PHYSICAL EXAM:   VITAL SIGNS: Visit Vitals  /74 (BP Location: RUE - Right upper extremity, Patient Position: Sitting, Cuff Size: Pediatric)   Pulse 84   Temp 97.2 °F (36.2 °C) (Temporal)   Ht 4' 10\" (1.473 m)   Wt 58.7 kg (129 lb 4.8 oz)   LMP 09/11/2024   SpO2 99%   BMI 27.02 kg/m²    64 %ile (Z= 0.36) based on [facility] [place]Girls, 2-20 Years[place] weight-for-age data using data from 9/20/2024.  GENERAL:  Well appearing female child.  Alert, active and consolable.  SKIN: Warm, normal turgor.  No cyanosis.  No rash.  HEAD:  Normocephalic, atraumatic.    EYES:  Conjunctivae appear normal, non-injected, non-icteric, positive red reflex.  NOSE:  Appears normal without drainage.  EARS:  Normal external auditory canals. TMs are transparent with normal landmarks.  THROAT:  Oropharynx with moist mucous membranes without lesions.  NECK:  Supple, no lymphadenopathy or masses.  HEART:  Regular rate and rhythm.  Normal S1, S2.  No murmurs, rubs, gallops.   LUNGS:  Clear to auscultation.  No wheezes, rales, rhonchi.  Normal work effort with breathing.  BREASTS: Not examined   ABDOMEN:  Bowel sounds present. Soft, non tender. No hepatomegaly, splenomegaly or masses.  GENITOURINARY: Not examined  EXTREMITIES: Symmetrical muscle mass, strength all extremities.  No effusions.   BACK: Spine straight.  No CVA tenderness.      NEUROLOGIC:  Oriented x 4. No gross lateralizing signs or focal deficits.  Normal gait.    Assessment:   Well 17 year old female adolescent with mild intermittent asthma, restricted eating and anxiety state.    Plan:  All parental concerns and questions discussed.  Anticipatory guidance provided.  Immunizations per orders.  Risks, benefits, and side effects discussed.     Orders for immunizations given today per the recommended schedule.  VIS for Immunizations given  Refer to behavioral health for assistance with anxiety and restrictive eating.  Refill the  patient's albuterol to use as needed for asthma symptoms.  Sports form completed.    Follow up:Return in about 1 year (around 9/20/2025) for Well woman visit, sooner if needed.    Orders Placed This Encounter    MENINGOCOCCAL ACWY (MENVEO) 10Y+    SERVICE TO BEHAVIORAL HEALTH    albuterol (Ventolin HFA) 108 (90 Base) MCG/ACT inhaler

## 2024-09-20 NOTE — PLAN OF CARE
Problem: Adult Inpatient Plan of Care  Goal: Optimal Comfort and Wellbeing  Outcome: Progressing     Problem: Bariatric Environmental Safety  Goal: Safety Maintained with Care  Outcome: Progressing

## 2024-09-20 NOTE — DISCHARGE SUMMARY
[facility] - [address] [place] [address] Surg Telemetry  General Surgery  Discharge Summary      Patient Name: Sonia Stock  MRN: [id number]  Admission Date: 9/18/2024  Hospital Length of Stay: 2 days  Discharge Date and Time:  09/20/2024 8:17 AM  Attending Physician: Eric Al MD   Discharging Provider: SUBHASH Juárez  Primary Care Provider: No Ag NP       Date: 09/20/2024      Admit and Discharge Diagnosis:   Morbid Obesity BMI 48.24    Procedure: Laparoscopic Vertical Sleeve Gastrectomy per Dr. Eric Al 9/18/202    Hospital Course:  Ms. Sonia Stock is a 38 y.o. female who was admitted for an elective bariatric procedure. Procedure performed as stated above. Upon completion of procedure, pt was transferred from the recovery room to the post surgical floor for further care and treatment. POD#1, afebrile, vital signs stable. The pt's diet was advanced to bariatric clear liquids. POD# 2 NV resolved, pt now tolerated bariatric clears well. AFVSS. No issues with urination or ambulating.     Review of Systems: Mild incisional pain reported but tolerable. No nausea, vomiting, dysphagia, GERD. Patient ambulating in the room/hallway and voiding without difficulty. Pt denies any dizziness, palpitations, SOB, or CP. All other review of systems are negative.     Physical Examination:   Vital signs: stable, noted in chart  General: Awake and alert, able to answer all questions. Resting in bed with family member at bedside  Respiratory:  Clear to auscultation bilaterally  Cardio: Regular rate and rhythm.  Abdomen: Soft, non distended. Bowel sounds present to all 4 quadrants. Lap sites clean, dry, and intact. Appropriate point tenderness to lap sites. Abdomen benign.   Neuro: Alert and oriented x's 4.    Consults:   Consults (From admission, onward)          Status Ordering Provider     Inpatient consult to Registered Dietitian/Nutritionist  Once        Provider:  (Not yet  assigned)    Completed SEEMA SMYTH            Significant Diagnostic Studies: no labs today    Pending Diagnostic Studies:       None          Final Active Diagnoses:    Diagnosis Date Noted POA    PRINCIPAL PROBLEM:  Morbid obesity with BMI of 45.0-49.9, adult [E66.01, Z68.42] 11/17/2023 Not Applicable      Problems Resolved During this Admission:      Discharged Condition: good    Disposition: Home or Self Care    Follow Up:   Follow-up Information       Eric Al MD Follow up in 2 week(s).    Specialty: General Surgery  Contact information:  [address]   [address]  [facility] [address]  [phone]                           Patient Instructions:   No discharge procedures on file.  Medications:  Reconciled Home Medications:      Medication List        START taking these medications      acetaminophen 500 MG tablet  Commonly known as: TYLENOL  Take 2 tablets (1,000 mg total) by mouth every 8 (eight) hours. for 3 days     ondansetron 4 MG Tbdl  Commonly known as: ZOFRAN-ODT  Take 1 tablet (4 mg total) by mouth every 6 (six) hours as needed (first line med prn NV).     pantoprazole 40 MG tablet  Commonly known as: PROTONIX  Take 1 tablet (40 mg total) by mouth once daily. Take once daily for first 30 days post op.     promethazine 12.5 MG Tab  Commonly known as: PHENERGAN  Take 1 tablet (12.5 mg total) by mouth every 6 (six) hours as needed (second line prn NV).            CONTINUE taking these medications      atorvastatin 40 MG tablet  Commonly known as: LIPITOR  Take 40 mg by mouth once daily.            STOP taking these medications      hydroCHLOROthiazide 12.5 mg capsule  Commonly known as: MICROZIDE              Impression and Plan:     Pt POD#2 status post Lap sleeve gastrectomy is progressing well per bariatric protocol. We will plan to prepare for discharge today. She reports her transportation to home cannot pick her up until after 1700 today. Ok for discharge today when her transportation  arrives. Would like her to continue IVF today while she is admitted.   - Continue bariatric clears  - Continue ambulation in perez  - Continue IS  - Resume home meds as appropriate  - Discharge instructions reviewed with patient and family. All questions answered.   - FU 2 weeks in office with Dr. Mikaela Martinez, ANP  General Surgery  [facility] - [address] Med Surg Telemetry

## 2024-09-21 ENCOUNTER — PATIENT MESSAGE (OUTPATIENT)
Dept: ADMINISTRATIVE | Facility: OTHER | Age: 39
End: 2024-09-21
Payer: MEDICAID

## 2024-09-22 ENCOUNTER — PATIENT MESSAGE (OUTPATIENT)
Dept: ADMINISTRATIVE | Facility: OTHER | Age: 39
End: 2024-09-22
Payer: MEDICAID

## 2024-09-22 LAB
ESTROGEN SERPL-MCNC: NORMAL PG/ML
INSULIN SERPL-ACNC: NORMAL U[IU]/ML
LAB AP CLINICAL INFORMATION: NORMAL
LAB AP GROSS DESCRIPTION: NORMAL
LAB AP REPORT FOOTNOTES: NORMAL
T3RU NFR SERPL: NORMAL %

## 2024-09-24 ENCOUNTER — TELEPHONE (OUTPATIENT)
Dept: BARIATRICS | Facility: HOSPITAL | Age: 39
End: 2024-09-24
Payer: MEDICAID

## 2024-09-24 DIAGNOSIS — Z98.84 HISTORY OF BARIATRIC SURGERY: Primary | ICD-10-CM

## 2024-09-25 ENCOUNTER — TELEPHONE (OUTPATIENT)
Dept: BARIATRICS | Facility: HOSPITAL | Age: 39
End: 2024-09-25
Payer: MEDICAID

## 2024-09-25 DIAGNOSIS — Z98.84 HISTORY OF BARIATRIC SURGERY: Primary | ICD-10-CM

## 2024-09-25 NOTE — TELEPHONE ENCOUNTER
Date call was completed: 9/25/24  Date of Surgery: 9/18/24  Surgery type: sleeve gastrectomy    Are you drinking the recommended amount of water (73-100oz) a day? []  Yes        [x]  No  If not, how may ounces of water are you drinking? 35 fl oz/d    Are you drinking the recommended amount of protein a day?(recommendations base on individual goal) [x]  Yes        []  No  If not, how many grams of protein are you drinking? 30-50g/d    Are you taking the recommended supplements that were discussed in pre-op class?  [x]  Yes   [] No  Multivitamin [x]  Yes        []  No  Calcium Supplement [x]  Yes        []  No  Iron [x]  Yes        []  No  Miralax [x]  Yes        []  No    Are you walking at least 20 minutes a day for exercise? [x]  Yes   [] No    Have you resumed your home medications that you were instructed to resume? [x]  Yes   [] No    Do you have a follow-up appt scheduled with your family doctor or doctor treating you for you co-morb conditions within the week? [x]  Yes   [] No    Are you taking the Protonix (at night) that was prescribed to you in the hospital? []  Yes   [x] No- Pt advised to begin taking this daily.     Are you showering daily with an antibacterial soap?  [x]  Yes   [] No    Have you had a bowel movement since surgery? [x]  Yes   [] No

## 2024-10-02 ENCOUNTER — CLINICAL SUPPORT (OUTPATIENT)
Dept: BARIATRICS | Facility: HOSPITAL | Age: 39
End: 2024-10-02

## 2024-10-02 VITALS — HEIGHT: 65 IN | BODY MASS INDEX: 44.98 KG/M2 | WEIGHT: 270 LBS

## 2024-10-02 DIAGNOSIS — Z98.84 HISTORY OF BARIATRIC SURGERY: Primary | ICD-10-CM

## 2024-10-02 NOTE — PROGRESS NOTES
"POST OP BARIATRIC NUTRITION FOLLOW UP    Type of surgery: sleeve gastrectomy  Post-op visit:   [x] 2 weeks  [] 4 weeks:  [] 2 months:  [] 4 months:  [] 6 months:  [] 9 months:  [] 1 year:   [] Other:     Height:   Ht Readings from Last 1 Encounters:   10/02/24 5' 5" (1.651 m)      Weight:   Wt Readings from Last 3 Encounters:   10/02/24 1512 122.5 kg (270 lb)   09/20/24 0530 131.5 kg (289 lb 14.5 oz)   09/18/24 1300 127.2 kg (280 lb 6.4 oz)   09/18/24 0602 127.2 kg (280 lb 6.4 oz)   09/13/24 1005 128.4 kg (283 lb)      BMI:   BMI Readings from Last 1 Encounters:   10/02/24 44.93 kg/m²            Post op complications:   [] Yes [x] No  If yes: [] Nausea   [] Vomiting   [] Constipation    [] Diarrhea    [] Other:     Dietary tolerance:  [x] Yes [] No [] Comment:     Adequate fluid intake:  [] Yes [x] No Approx. daily fluid intake: 45g/d  Adequate protein intake:  [] Yes [x] No  Approx. daily protein intake: 32fl oz/d    Vitamins/Minerals:   [x] MVI:    [] Biotin:  [x] Calcium:    [] Hair/Nails:  [] B 50 complex:   [] Bariatric Specific MVI:  [] B12:    [] Bariatric Specific Calcium:  [x] Iron:    [] Other:   [] Non-compliance:        Labs:   not ordered for this visit.  Comment:    Expected compliance:  [x]Good  []Fair  []Poor      Progress:   [x]Patient progressing well at this time with no complaints   [] Patient expressed complaint at this time: See additional notes       Bariatric Diet Education:   Verbalizes understanding Demonstrates  Needs further teaching Needs practice/ supervision Comments    Bariatric Clear [] [] [] []    Bariatric Full [] [] [] []    Bariatric Puree [x] [] [] []    Bariatric Soft [x] [] [] []    Bariatric Regular [] [] [] []    Bariatric Reintroduction of Starchy CHO [] [] [] []    Bariatric: Mindful Eating [] [] [] []    Importance of Protein and Vitamin Protocol [x] [] [] []    Other:            Additional Notes:        "

## 2024-10-07 ENCOUNTER — TELEPHONE (OUTPATIENT)
Dept: BARIATRICS | Facility: HOSPITAL | Age: 39
End: 2024-10-07
Payer: MEDICAID

## 2024-10-08 ENCOUNTER — OFFICE VISIT (OUTPATIENT)
Dept: SURGERY | Facility: CLINIC | Age: 39
End: 2024-10-08
Payer: MEDICAID

## 2024-10-08 VITALS
HEART RATE: 76 BPM | DIASTOLIC BLOOD PRESSURE: 90 MMHG | BODY MASS INDEX: 44.12 KG/M2 | HEIGHT: 65 IN | SYSTOLIC BLOOD PRESSURE: 132 MMHG | WEIGHT: 264.81 LBS

## 2024-10-08 DIAGNOSIS — Z48.89 ENCOUNTER FOR POSTOPERATIVE CARE: ICD-10-CM

## 2024-10-08 DIAGNOSIS — Z91.89 ELECTROLYTE IMBALANCE RISK: ICD-10-CM

## 2024-10-08 DIAGNOSIS — E66.01 MORBID OBESITY WITH BMI OF 40.0-44.9, ADULT: Primary | ICD-10-CM

## 2024-10-08 DIAGNOSIS — Z13.0 SCREENING FOR IRON DEFICIENCY ANEMIA: ICD-10-CM

## 2024-10-08 DIAGNOSIS — Z13.21 ENCOUNTER FOR VITAMIN DEFICIENCY SCREENING: ICD-10-CM

## 2024-10-08 DIAGNOSIS — Z90.3 HISTORY OF SLEEVE GASTRECTOMY: ICD-10-CM

## 2024-10-08 PROCEDURE — 3075F SYST BP GE 130 - 139MM HG: CPT | Mod: CPTII,,, | Performed by: SURGERY

## 2024-10-08 PROCEDURE — 1159F MED LIST DOCD IN RCRD: CPT | Mod: CPTII,,, | Performed by: SURGERY

## 2024-10-08 PROCEDURE — 99024 POSTOP FOLLOW-UP VISIT: CPT | Mod: ,,, | Performed by: SURGERY

## 2024-10-08 PROCEDURE — 3080F DIAST BP >= 90 MM HG: CPT | Mod: CPTII,,, | Performed by: SURGERY

## 2024-10-08 RX ORDER — LANOLIN ALCOHOL/MO/W.PET/CERES
1 CREAM (GRAM) TOPICAL
COMMUNITY

## 2024-10-08 NOTE — PROGRESS NOTES
[facility] Surgical - General Surgery Services  [address] [place] [address]  Phone: [phone]  Fax: [phone]    [facility]  Post op Bariatric Note  Dr. Eric Al      Patient: Sonia Stock  Date: 10/08/2024   Author: SUBHASH Juárez    Chief Complaint:   Chief Complaint   Patient presents with    Post-op Evaluation     [facility] 09/18/24- 2 week fu (Medicaid)       POD# 20    History of Present Illness:  Ms. Sonia Stock is a 38 y.o. female who is POD# 20 s/p Lap Sleeve Gastrectomy on 9/18/24 by Dr. Eric Al.   Pathology benign.     Admit and Discharge Diagnosis:   Morbid Obesity BMI 48.24     Procedure: Laparoscopic Vertical Sleeve Gastrectomy per Dr. Eric Al 9/18/2024    Presents today for routine 3 week follow up appt.   No complaints. No dysphagia, odynophagia, GERD, NV, or abd pain. Regular BMs.     Diet: compliant with bariatric meal plan, tolerating bariatric clears and puree  Exercise: regular exercise, walking   Vitamins: compliant with bariatric supplementation per protocol    Ht: 65in  Weights:   Trend Weights BMI   Starting 293# 48   Pre-Op 286# 47   3 Week  264 #   44   2 Month       6 Month        1 Year       2 Year                          Pertinent History:  HTN - [x] Yes   [] No    [] Resolved  HLD - [] Yes   [x] No    [] Resolved  DM  -  [] Yes   [x] No    [] Resolved  VANNESSA - [] Yes   [x] No   [] Resolved  GERD - [] Yes   [x] No [] Resolved  Anticoagulation- [] Yes   [x] No      If yes, type: [] ASA [] Plavix [] Other    Patient Care Team:  No Ag NP as PCP - General (Urgent Care)  Eric Al MD as Surgeon (Bariatrics)     Review of Systems:    12 point review of systems conducted, negative except as stated in the history of present illness. See HPI for details.    Review of patient's allergies indicates:   Allergen Reactions    Aspirin Hives    Azithromycin  "Rash     Past Medical History:   Diagnosis Date    Hypertension     Left bundle branch block     Obesity      Past Surgical History:   Procedure Laterality Date    BILATERAL TUBAL LIGATION      CHOLECYSTECTOMY      DILATION AND CURETTAGE OF UTERUS      ESOPHAGOGASTRODUODENOSCOPY N/A 11/17/2023    Procedure: EGD (ESOPHAGOGASTRODUODENOSCOPY);  Surgeon: Eric Al MD;  Location: [facility] OR;  Service: General;  Laterality: N/A;    LAPAROSCOPIC SLEEVE GASTRECTOMY N/A 9/18/2024    Procedure: GASTRECTOMY, SLEEVE, LAPAROSCOPIC;  Surgeon: Eric Al MD;  Location: [facility] OR;  Service: General;  Laterality: N/A;    TUBAL LIGATION       No family history on file.  Social History     Tobacco Use    Smoking status: Never    Smokeless tobacco: Never   Substance Use Topics    Alcohol use: Yes      Current Outpatient Medications   Medication Instructions    atorvastatin (LIPITOR) 40 mg, Oral, Daily    ondansetron (ZOFRAN-ODT) 4 mg, Oral, Every 6 hours PRN    pantoprazole (PROTONIX) 40 mg, Oral, Daily, Take once daily for first 30 days post op.    promethazine (PHENERGAN) 12.5 mg, Oral, Every 6 hours PRN       Objective:     Vital Signs (Most Recent):  Visit Vitals  BP (!) 132/90   Pulse 76   Ht 5' 5" (1.651 m)   Wt 120.1 kg (264 lb 12.8 oz)   LMP 09/01/2024 (Approximate)   BMI 44.07 kg/m²       Physical Exam:  General: Alert and oriented, No acute distress.  Respiratory: Clear to auscultation bilaterally; No wheezes, rales or rhonchi,Non-labored respirations  Cardiovascular: Regular rate and rhythm   Gastrointestinal: Abd soft, Non-tender, Non-distended, Bowel sounds present, Lap sites clean dry and intact, no evidence of infection.   Musculoskeletal: Normal range of motion.  Integumentary: Warm, Dry, Intact  Neurologic: No focal deficits  Psychiatric: Appropriate affect    Assessment and Plan:       ICD-10-CM ICD-9-CM   1. Morbid obesity with BMI of 40.0-44.9, adult  E66.01 278.01    Z68.41 V85.41   2. Encounter for " vitamin deficiency screening  Z13.21 V77.99   3. Screening for iron deficiency anemia  Z13.0 V78.0   4. Electrolyte imbalance risk  Z91.89 V49.89   5. Encounter for postoperative care  Z48.89 V58.49   6. History of sleeve gastrectomy  Z90.3 V15.29       3 weeks post op s/p Laparoscopic Sleeve Gastrectomy.     - Follow up in 6 weeks with bariatric labs  - Continue diet per bariatric protocol  - Exercise encouraged  - Continue vitamin supplementation  - Support group attendance encouraged  - Keep routine appts with PCP/specialists as scheduled  - Dr. Al examined patient, discussed recommendations, and answered all questions.     SUBHASH Juárez      I, SUBHASH Chester, am scribing for, and in the presence of, Eric Al MD, performed the above scribed service and the documentation accurately describes the services I performed. I attest to the accuracy of the note.

## 2024-10-19 ENCOUNTER — PATIENT MESSAGE (OUTPATIENT)
Dept: ADMINISTRATIVE | Facility: OTHER | Age: 39
End: 2024-10-19
Payer: MEDICAID

## 2024-10-21 ENCOUNTER — TELEPHONE (OUTPATIENT)
Dept: SURGERY | Facility: CLINIC | Age: 39
End: 2024-10-21
Payer: MEDICAID

## 2024-10-21 NOTE — TELEPHONE ENCOUNTER
Dr Lee spoke to this pt on Friday. She was on a fluid pill that she was told to stop. He suggested to get back on it and call her PCP on Monday. Pt verbalized understanding.

## 2024-10-21 NOTE — TELEPHONE ENCOUNTER
Contacted pt. She said she only took it that Friday. Stated she can feel when her pressure is high.  Recc to restart and call PCP.   PCP appt next week     ED precautions given

## 2024-11-06 ENCOUNTER — HOSPITAL ENCOUNTER (EMERGENCY)
Facility: HOSPITAL | Age: 39
Discharge: HOME | End: 2024-11-06
Attending: STUDENT IN AN ORGANIZED HEALTH CARE EDUCATION/TRAINING PROGRAM
Payer: MEDICAID

## 2024-11-06 VITALS
RESPIRATION RATE: 20 BRPM | TEMPERATURE: 98 F | OXYGEN SATURATION: 96 % | HEART RATE: 72 BPM | SYSTOLIC BLOOD PRESSURE: 134 MMHG | HEIGHT: 66 IN | BODY MASS INDEX: 38.57 KG/M2 | WEIGHT: 240 LBS | DIASTOLIC BLOOD PRESSURE: 87 MMHG

## 2024-11-06 DIAGNOSIS — M70.62 GREATER TROCHANTERIC BURSITIS OF LEFT HIP: Primary | ICD-10-CM

## 2024-11-06 DIAGNOSIS — M76.32 IT BAND SYNDROME, LEFT: ICD-10-CM

## 2024-11-06 DIAGNOSIS — G89.29 HIP PAIN, CHRONIC, LEFT: ICD-10-CM

## 2024-11-06 DIAGNOSIS — M25.552 HIP PAIN, CHRONIC, LEFT: ICD-10-CM

## 2024-11-06 LAB
B-HCG UR QL: NEGATIVE
BACTERIA #/AREA URNS AUTO: ABNORMAL /HPF
BILIRUB UR QL STRIP.AUTO: ABNORMAL
CLARITY UR: ABNORMAL
COLOR UR AUTO: YELLOW
GLUCOSE UR QL STRIP: NEGATIVE
HGB UR QL STRIP: NEGATIVE
KETONES UR QL STRIP: >=80
LEUKOCYTE ESTERASE UR QL STRIP: ABNORMAL
NITRITE UR QL STRIP: NEGATIVE
PH UR STRIP: 5.5 [PH]
PROT UR QL STRIP: ABNORMAL
RBC #/AREA URNS AUTO: ABNORMAL /HPF
SP GR UR STRIP.AUTO: >=1.03 (ref 1–1.03)
SQUAMOUS #/AREA URNS AUTO: ABNORMAL /HPF
UROBILINOGEN UR STRIP-ACNC: 0.2
WBC #/AREA URNS AUTO: ABNORMAL /HPF

## 2024-11-06 PROCEDURE — 81003 URINALYSIS AUTO W/O SCOPE: CPT | Performed by: STUDENT IN AN ORGANIZED HEALTH CARE EDUCATION/TRAINING PROGRAM

## 2024-11-06 PROCEDURE — 99284 EMERGENCY DEPT VISIT MOD MDM: CPT | Mod: 25

## 2024-11-06 PROCEDURE — 81025 URINE PREGNANCY TEST: CPT | Performed by: STUDENT IN AN ORGANIZED HEALTH CARE EDUCATION/TRAINING PROGRAM

## 2024-11-06 PROCEDURE — 96372 THER/PROPH/DIAG INJ SC/IM: CPT | Performed by: STUDENT IN AN ORGANIZED HEALTH CARE EDUCATION/TRAINING PROGRAM

## 2024-11-06 PROCEDURE — 63600175 PHARM REV CODE 636 W HCPCS: Performed by: STUDENT IN AN ORGANIZED HEALTH CARE EDUCATION/TRAINING PROGRAM

## 2024-11-06 RX ORDER — KETOROLAC TROMETHAMINE 30 MG/ML
30 INJECTION, SOLUTION INTRAMUSCULAR; INTRAVENOUS
Status: COMPLETED | OUTPATIENT
Start: 2024-11-06 | End: 2024-11-06

## 2024-11-06 RX ORDER — METHOCARBAMOL 500 MG/1
500 TABLET, FILM COATED ORAL 3 TIMES DAILY
Qty: 15 TABLET | Refills: 0 | Status: SHIPPED | OUTPATIENT
Start: 2024-11-06 | End: 2024-11-11

## 2024-11-06 RX ADMIN — KETOROLAC TROMETHAMINE 30 MG: 30 INJECTION, SOLUTION INTRAMUSCULAR at 10:11

## 2024-11-06 NOTE — ED PROVIDER NOTES
Encounter Date: 11/6/2024       History     Chief Complaint   Patient presents with    Hip Pain     Pt c/o chronic pain to left hip that is getting worse.     HPI    38-year-old female with a past medical history of hypertension obesity presents emergency left hip pain.  Patient states she sustained a fall in 2018, 6 years ago, when she fell down some stairs.  She states she went to an ER and was told she had a contusion.  States she has been having pain ever since.  States the pain worse this morning.  States it kept her up from sleep last night.  No medications taken at.  No swelling.  No back pain.    Review of patient's allergies indicates:   Allergen Reactions    Aspirin Hives    Azithromycin Rash     Past Medical History:   Diagnosis Date    Hypertension     Left bundle branch block     Obesity      Past Surgical History:   Procedure Laterality Date    BILATERAL TUBAL LIGATION      CHOLECYSTECTOMY      DILATION AND CURETTAGE OF UTERUS      ESOPHAGOGASTRODUODENOSCOPY N/A 11/17/2023    Procedure: EGD (ESOPHAGOGASTRODUODENOSCOPY);  Surgeon: Eric Al MD;  Location: [facility] OR;  Service: General;  Laterality: N/A;    LAPAROSCOPIC SLEEVE GASTRECTOMY N/A 9/18/2024    Procedure: GASTRECTOMY, SLEEVE, LAPAROSCOPIC;  Surgeon: Eric Al MD;  Location: [facility] OR;  Service: General;  Laterality: N/A;    TUBAL LIGATION       No family history on file.  Social History     Tobacco Use    Smoking status: Never    Smokeless tobacco: Never   Substance Use Topics    Alcohol use: Yes     Review of Systems   Constitutional:  Negative for fever.   Respiratory:  Negative for cough.    Cardiovascular:  Negative for chest pain.   Gastrointestinal:  Negative for abdominal pain, constipation, diarrhea, nausea and vomiting.   Musculoskeletal:  Positive for arthralgias.   Neurological:  Negative for headaches.   All other systems reviewed and are negative.      Physical Exam     Initial Vitals [11/06/24 0849]   BP Pulse Resp  Temp SpO2   134/87 72 20 97.7 °F (36.5 °C) 96 %      MAP       --         Physical Exam    Nursing note and vitals reviewed.  Constitutional: She appears well-developed and well-nourished. No distress.   Cardiovascular:  Normal rate and regular rhythm.           Pulmonary/Chest: Breath sounds normal. No respiratory distress. She has no wheezes. She has no rhonchi. She has no rales.   Abdominal: Abdomen is soft. There is no abdominal tenderness. There is no rebound and no guarding.   Musculoskeletal:         General: Tenderness present. Normal range of motion.      Right hip: Normal.      Left hip: Tenderness present.        Legs:       Comments: Tenderness to the left ITB band     Neurological: She is alert and oriented to person, place, and time. She has normal strength.   Skin: Skin is warm. Capillary refill takes less than 2 seconds.         ED Course   Procedures  Labs Reviewed   URINALYSIS, REFLEX TO URINE CULTURE - Abnormal       Result Value    Color, UA Yellow      Appearance, UA Cloudy (*)     Specific Gravity, UA >=1.030      pH, UA 5.5      Protein, UA Trace (*)     Glucose, UA Negative      Ketones, UA >=80 (*)     Blood, UA Negative      Bilirubin, UA Moderate (*)     Urobilinogen, UA 0.2      Nitrites, UA Negative      Leukocyte Esterase, UA Small (*)    URINALYSIS, MICROSCOPIC - Abnormal    Bacteria, UA Moderate (*)     RBC, UA None Seen      WBC, UA 3-5      Squamous Epithelial Cells, UA Many (*)     Narrative:     Urine microscopic results may be inaccurate, <3cc sample submitted. Microscopic performed on unspun urine   PREGNANCY TEST, URINE RAPID - Normal    hCG Qualitative, Urine Negative            Imaging Results              X-Ray Hip 2 or 3 views Left with Pelvis when performed (In process)  Result time 11/06/24 10:53:08                     Medications   ketorolac injection 30 mg (has no administration in time range)     Medical Decision Making  Initial Assessment:       Left hip  pain      Differential Diagnosis:   Judging by the patient's chief complaint and pertinent history, the patient has the following possible differential diagnoses, including but not limited to the following.  Some of these are deemed to be lower likelihood and some more likely based on my physical exam and history combined with possible lab work and/or imaging studies.   Please see the pertinent studies, and refer to the HPI.  Some of these diagnoses will take further evaluation to fully rule out, perhaps as an outpatient and the patient was encouraged to follow up when discharged for more comprehensive evaluation.      Arthritis, bursitis, ITB band syndrome,  as well as multiple other possible etiologies      Problems Addressed:  Greater trochanteric bursitis of left hip: chronic illness or injury  It band syndrome, left: chronic illness or injury    Amount and/or Complexity of Data Reviewed  Labs:  Decision-making details documented in ED Course.  Radiology: ordered.    Risk  Prescription drug management.               ED Course as of 11/06/24 1054   Wed Nov 06, 2024   1020 hCG Qualitative, Urine: Negative [BS]   1020 Leukocyte Esterase, UA(!): Small [BS]   1020 NITRITE UA: Negative [BS]   1053 Will treat for a combination of ITB band syndrome and bursitis.  Patient states she tolerate ibuprofen and NSAIDs okay just can not take aspirin.  Will give a shot of Toradol but will not prescribe any because of the history of gastric sleeve.  Will give some Robaxin and some stretches to do.  Patient agrees with plan.  Will follow up with PCP. [BS]      ED Course User Index  [BS] Andres Ugalde MD                           Clinical Impression:  Final diagnoses:  [M25.552, G89.29] Hip pain, chronic, left  [M70.62] Greater trochanteric bursitis of left hip (Primary)  [M76.32] It band syndrome, left          ED Disposition Condition    Discharge Stable          ED Prescriptions       Medication Sig Dispense Start Date End  Date Auth. Provider    methocarbamoL (ROBAXIN) 500 MG Tab Take 1 tablet (500 mg total) by mouth 3 (three) times daily. for 5 days 15 tablet 11/6/2024 11/11/2024 Andres Ugalde MD          Follow-up Information       Follow up With Specialties Details Why Contact Info    No Ag NP Urgent Care, Emergency Medicine Schedule an appointment as soon as possible for a visit   [address] [facility] [address] [phone]      [facility] - Emergency Dept Emergency Medicine Go to  If symptoms worsen [address]  [facility] [phone]             Andres Ugalde MD  11/06/24 1057

## 2024-11-07 ENCOUNTER — TELEPHONE (OUTPATIENT)
Dept: SURGERY | Facility: CLINIC | Age: 39
End: 2024-11-07
Payer: MEDICAID

## 2024-11-07 NOTE — TELEPHONE ENCOUNTER
Spoke with pt she will go to an [facility] facility prior to her appt to have labs drawn   Orders in chart   Will fu

## 2024-11-19 ENCOUNTER — CLINICAL SUPPORT (OUTPATIENT)
Dept: BARIATRICS | Facility: HOSPITAL | Age: 39
End: 2024-11-19

## 2024-11-19 VITALS — BODY MASS INDEX: 40.66 KG/M2 | WEIGHT: 253 LBS | HEIGHT: 66 IN

## 2024-11-19 DIAGNOSIS — Z98.84 HISTORY OF BARIATRIC SURGERY: Primary | ICD-10-CM

## 2024-11-19 NOTE — PROGRESS NOTES
"POST OP BARIATRIC NUTRITION FOLLOW UP    Type of surgery: sleeve gastrectomy  Post-op visit:   [] 2 weeks  [] 4 weeks:  [x] 2 months:  [] 4 months:  [] 6 months:  [] 9 months:  [] 1 year:   [] Other:     Height:   Ht Readings from Last 1 Encounters:   11/19/24 5' 6" (1.676 m)      Weight:   Wt Readings from Last 3 Encounters:   11/19/24 1308 114.8 kg (253 lb)   11/06/24 0849 108.9 kg (240 lb)   10/08/24 1340 120.1 kg (264 lb 12.8 oz)      BMI:   BMI Readings from Last 1 Encounters:   11/19/24 40.84 kg/m²            Post op complications:   [x] Yes [] No  If yes: [] Nausea   [x] Vomiting   [] Constipation    [] Diarrhea    [] Other:     Dietary tolerance:  [] Yes [x] No [] Comment: shrimp, baked chicken    Adequate fluid intake:  [] Yes [x] No Approx. daily fluid intake: 50-64 fl oz/d  Adequate protein intake:  [] Yes [] No  Approx. daily protein intake:    Vitamins/Minerals:   [x] MVI:    [] Biotin:  [x] Calcium:    [] Hair/Nails:  [] B 50 complex:   [] Bariatric Specific MVI:  [] B12:    [] Bariatric Specific Calcium:  [x] Iron:    [] Other:   [] Non-compliance:        Labs:   not completed at time  Comment:    Expected compliance:  [x]Good  []Fair  []Poor      Progress:   []Patient progressing well at this time with no complaints   [x] Patient expressed complaint at this time: See additional notes       Bariatric Diet Education:   Verbalizes understanding Demonstrates  Needs further teaching Needs practice/ supervision Comments    Bariatric Clear [] [] [] []    Bariatric Full [] [] [] []    Bariatric Puree [] [] [] []    Bariatric Soft [x] [] [] []    Bariatric Regular [] [] [] []    Bariatric Reintroduction of Starchy CHO [] [] [] []    Bariatric: Mindful Eating [] [] [] []    Importance of Protein and Vitamin Protocol [] [] [] []    Other:            Additional Notes:    Pt started vomiting Thursday, 11/14/24. Pt states she might be eating too fast. Pt is tolerating softer foods such as scrambled eggs, softly " cooked mixed vegetables, soft fruits, fish, protein shakes. Pt was advised to remain on this softer diet and to work on slowly her eating pace to see if this relieves symptoms. Will call pt in 48-72 hours to see how she is doing. May advance to regular consistency if symptoms have been alleviated.

## 2024-11-21 ENCOUNTER — LAB VISIT (OUTPATIENT)
Dept: LAB | Facility: HOSPITAL | Age: 39
End: 2024-11-21
Attending: SURGERY
Payer: MEDICAID

## 2024-11-21 ENCOUNTER — OFFICE VISIT (OUTPATIENT)
Dept: SURGERY | Facility: CLINIC | Age: 39
End: 2024-11-21
Payer: MEDICAID

## 2024-11-21 ENCOUNTER — INFUSION (OUTPATIENT)
Dept: INFUSION THERAPY | Facility: HOSPITAL | Age: 39
End: 2024-11-21
Attending: NURSE PRACTITIONER
Payer: MEDICAID

## 2024-11-21 ENCOUNTER — PATIENT MESSAGE (OUTPATIENT)
Dept: SURGERY | Facility: CLINIC | Age: 39
End: 2024-11-21

## 2024-11-21 ENCOUNTER — CLINICAL SUPPORT (OUTPATIENT)
Dept: BARIATRICS | Facility: HOSPITAL | Age: 39
End: 2024-11-21
Attending: SURGERY
Payer: MEDICAID

## 2024-11-21 VITALS
BODY MASS INDEX: 40.98 KG/M2 | HEART RATE: 57 BPM | SYSTOLIC BLOOD PRESSURE: 132 MMHG | DIASTOLIC BLOOD PRESSURE: 83 MMHG | HEIGHT: 66 IN | WEIGHT: 255 LBS

## 2024-11-21 DIAGNOSIS — Z13.0 SCREENING, ANEMIA, DEFICIENCY, IRON: ICD-10-CM

## 2024-11-21 DIAGNOSIS — Z13.0 SCREENING FOR IRON DEFICIENCY ANEMIA: ICD-10-CM

## 2024-11-21 DIAGNOSIS — Z71.3 ENCOUNTER FOR WEIGHT LOSS COUNSELING: ICD-10-CM

## 2024-11-21 DIAGNOSIS — Z98.84 HISTORY OF BARIATRIC SURGERY: Primary | ICD-10-CM

## 2024-11-21 DIAGNOSIS — E66.01 MORBID OBESITY: ICD-10-CM

## 2024-11-21 DIAGNOSIS — Z13.21 ENCOUNTER FOR VITAMIN DEFICIENCY SCREENING: Primary | ICD-10-CM

## 2024-11-21 DIAGNOSIS — Z71.3 DIETARY COUNSELING: ICD-10-CM

## 2024-11-21 DIAGNOSIS — Z13.21 ENCOUNTER FOR VITAMIN DEFICIENCY SCREENING: ICD-10-CM

## 2024-11-21 DIAGNOSIS — E86.0 DEHYDRATION: Primary | ICD-10-CM

## 2024-11-21 DIAGNOSIS — Z91.89 ELECTROLYTE IMBALANCE RISK: ICD-10-CM

## 2024-11-21 DIAGNOSIS — R13.10 DYSPHAGIA, UNSPECIFIED TYPE: ICD-10-CM

## 2024-11-21 DIAGNOSIS — E86.0 DEHYDRATION: ICD-10-CM

## 2024-11-21 LAB
ALBUMIN SERPL-MCNC: 4.2 G/DL (ref 3.5–5)
ALBUMIN/GLOB SERPL: 1.8 RATIO (ref 1.1–2)
ALP SERPL-CCNC: 58 UNIT/L (ref 40–150)
ALT SERPL-CCNC: 11 UNIT/L (ref 0–55)
ANION GAP SERPL CALC-SCNC: 6 MEQ/L
AST SERPL-CCNC: 20 UNIT/L (ref 5–34)
BASOPHILS # BLD AUTO: 0.06 X10(3)/MCL
BASOPHILS NFR BLD AUTO: 1.1 %
BILIRUB SERPL-MCNC: 0.4 MG/DL
BUN SERPL-MCNC: 7.2 MG/DL (ref 7–18.7)
CALCIUM SERPL-MCNC: 9.8 MG/DL (ref 8.4–10.2)
CHLORIDE SERPL-SCNC: 110 MMOL/L (ref 98–107)
CO2 SERPL-SCNC: 26 MMOL/L (ref 22–29)
CREAT SERPL-MCNC: 0.83 MG/DL (ref 0.55–1.02)
CREAT/UREA NIT SERPL: 9
EOSINOPHIL # BLD AUTO: 0.09 X10(3)/MCL (ref 0–0.9)
EOSINOPHIL NFR BLD AUTO: 1.6 %
ERYTHROCYTE [DISTWIDTH] IN BLOOD BY AUTOMATED COUNT: 14 % (ref 11.5–17)
GFR SERPLBLD CREATININE-BSD FMLA CKD-EPI: >60 ML/MIN/1.73/M2
GLOBULIN SER-MCNC: 2.4 GM/DL (ref 2.4–3.5)
GLUCOSE SERPL-MCNC: 91 MG/DL (ref 74–100)
HCT VFR BLD AUTO: 38.2 % (ref 37–47)
HGB BLD-MCNC: 12 G/DL (ref 12–16)
IMM GRANULOCYTES # BLD AUTO: 0.01 X10(3)/MCL (ref 0–0.04)
IMM GRANULOCYTES NFR BLD AUTO: 0.2 %
IRON SATN MFR SERPL: 33 % (ref 20–50)
IRON SERPL-MCNC: 82 UG/DL (ref 50–170)
LYMPHOCYTES # BLD AUTO: 2.28 X10(3)/MCL (ref 0.6–4.6)
LYMPHOCYTES NFR BLD AUTO: 40.9 %
MCH RBC QN AUTO: 28.4 PG (ref 27–31)
MCHC RBC AUTO-ENTMCNC: 31.4 G/DL (ref 33–36)
MCV RBC AUTO: 90.3 FL (ref 80–94)
MONOCYTES # BLD AUTO: 0.46 X10(3)/MCL (ref 0.1–1.3)
MONOCYTES NFR BLD AUTO: 8.3 %
NEUTROPHILS # BLD AUTO: 2.67 X10(3)/MCL (ref 2.1–9.2)
NEUTROPHILS NFR BLD AUTO: 47.9 %
NRBC BLD AUTO-RTO: 0 %
PLATELET # BLD AUTO: 332 X10(3)/MCL (ref 130–400)
PMV BLD AUTO: 9 FL (ref 7.4–10.4)
POTASSIUM SERPL-SCNC: 4.2 MMOL/L (ref 3.5–5.1)
PROT SERPL-MCNC: 6.6 GM/DL (ref 6.4–8.3)
RBC # BLD AUTO: 4.23 X10(6)/MCL (ref 4.2–5.4)
SODIUM SERPL-SCNC: 142 MMOL/L (ref 136–145)
TIBC SERPL-MCNC: 163 UG/DL (ref 70–310)
TIBC SERPL-MCNC: 245 UG/DL (ref 250–450)
TRANSFERRIN SERPL-MCNC: 205 MG/DL (ref 180–382)
VIT B12 SERPL-MCNC: 616 PG/ML (ref 213–816)
WBC # BLD AUTO: 5.57 X10(3)/MCL (ref 4.5–11.5)

## 2024-11-21 PROCEDURE — 25000003 PHARM REV CODE 250: Performed by: NURSE PRACTITIONER

## 2024-11-21 PROCEDURE — 63600175 PHARM REV CODE 636 W HCPCS: Performed by: NURSE PRACTITIONER

## 2024-11-21 PROCEDURE — 3075F SYST BP GE 130 - 139MM HG: CPT | Mod: CPTII,,, | Performed by: NURSE PRACTITIONER

## 2024-11-21 PROCEDURE — 96366 THER/PROPH/DIAG IV INF ADDON: CPT

## 2024-11-21 PROCEDURE — 36415 COLL VENOUS BLD VENIPUNCTURE: CPT

## 2024-11-21 PROCEDURE — 96365 THER/PROPH/DIAG IV INF INIT: CPT

## 2024-11-21 PROCEDURE — 99024 POSTOP FOLLOW-UP VISIT: CPT | Mod: ,,, | Performed by: NURSE PRACTITIONER

## 2024-11-21 PROCEDURE — 3079F DIAST BP 80-89 MM HG: CPT | Mod: CPTII,,, | Performed by: NURSE PRACTITIONER

## 2024-11-21 RX ORDER — HYOSCYAMINE SULFATE 0.12 MG/1
0.12 TABLET SUBLINGUAL EVERY 6 HOURS PRN
Qty: 30 TABLET | Refills: 1 | Status: SHIPPED | OUTPATIENT
Start: 2024-11-21

## 2024-11-21 RX ADMIN — FOLIC ACID: 5 INJECTION, SOLUTION INTRAMUSCULAR; INTRAVENOUS; SUBCUTANEOUS at 01:11

## 2024-11-21 NOTE — PROGRESS NOTES
Pt in office seeing Lifestyle /Behavioral Educator. Spoke to pt briefly following call on 11/19/24. Pt states she had not vomited in last 24+ hours. She has been eating more slowly which has helped. Will follow up with pt per protocol at 6m follow up.

## 2024-11-21 NOTE — PROGRESS NOTES
Progress Note  Sonia Stock  Chief Complaint   Patient presents with    Post-op Evaluation     VSG 09/18/24- 2 month fu (Medicaid)       History of Present Illness:  Ms. Sonia Stock is a 38 y.o. female who is 2mths s/p Lap Sleeve Gastrectomy on 9/18/24 by Dr. Eric Al.   Pathology benign.      Admit and Discharge Diagnosis:   Morbid Obesity BMI 48.24     Procedure: Laparoscopic Vertical Sleeve Gastrectomy per Dr. Eric Al 9/18/2024     Presents today for routine 2 mths follow up appt.   - C/o vomiting over the last 4 days mostly with chicken, pt has been able to tolerate protein shakes, water, fish, tuna, fruits, and soft veggies. Pt is just very disappointed because chicken is her favorite protein. Pt also thinks that she might be eating too quickly.   - Reports some mild dysphagia and odynophagia with chicken and shrimp. no GERD or abd pain. Regular BMs.      Diet: compliant with bariatric meal plan  Exercise: regular exercise, walking and weight training.   Vitamins: compliant with bariatric supplementation per protocol     Ht: 65in  Weights:   Trend Weights BMI   Starting 293# 48   Pre-Op 286# 47   3 Week  264 #   44   2 Month 255#  41   6 Month        1 Year       2 Year                          Pertinent History:  HTN - [x] Yes   [] No    [] Resolved  HLD - [] Yes   [x] No    [] Resolved  DM  -  [] Yes   [x] No    [] Resolved  VANNESSA - [] Yes   [x] No   [] Resolved  GERD - [] Yes   [x] No [] Resolved  Anticoagulation- [] Yes   [x] No      If yes, type: [] ASA [] Plavix [] Other    Patient Care Team:  No Ag NP as PCP - General (Urgent Care)  Eric Al MD as Surgeon (Bariatrics)     Subjective:     12 point review of systems conducted, negative except as stated in the history of present illness. See HPI for details.    Review of patient's allergies indicates:   Allergen Reactions    Aspirin Hives    Azithromycin Rash     Past Medical History:   Diagnosis  "Date    Hypertension     Left bundle branch block     Obesity      Past Surgical History:   Procedure Laterality Date    BILATERAL TUBAL LIGATION      CHOLECYSTECTOMY      DILATION AND CURETTAGE OF UTERUS      ESOPHAGOGASTRODUODENOSCOPY N/A 11/17/2023    Procedure: EGD (ESOPHAGOGASTRODUODENOSCOPY);  Surgeon: Eric Al MD;  Location: [facility] OR;  Service: General;  Laterality: N/A;    LAPAROSCOPIC SLEEVE GASTRECTOMY N/A 9/18/2024    Procedure: GASTRECTOMY, SLEEVE, LAPAROSCOPIC;  Surgeon: Eric Al MD;  Location: [facility] OR;  Service: General;  Laterality: N/A;    TUBAL LIGATION       No family history on file.  Social History     Tobacco Use    Smoking status: Never    Smokeless tobacco: Never   Substance Use Topics    Alcohol use: Yes      Current Outpatient Medications   Medication Instructions    atorvastatin (LIPITOR) 40 mg, Daily    calcium carbonate/vitamin D3 (CALTRATE 600 + D ORAL) Take by mouth.    ferrous sulfate (FEOSOL) Tab tablet 1 tablet, With breakfast    hyoscyamine 0.125 mg, Sublingual, Every 6 hours PRN    pantoprazole (PROTONIX) 40 mg, Oral, Daily, Take once daily for first 30 days post op.    pediatric multivitamin no.76 (FLINTSTONES COMPLETE ORAL) Take by mouth.       Objective:     Vital Signs (Most Recent):  Visit Vitals  /83   Pulse (!) 57   Ht 5' 6" (1.676 m)   Wt 115.7 kg (255 lb)   LMP 10/28/2024   BMI 41.16 kg/m²     Physical Exam:  General:  Alert and oriented.    Respiratory:  Lungs are clear to auscultation, Respirations are non-labored, Breath sounds are equal.    Cardiovascular:  Normal rate, Regular rhythm, No murmur.    Gastrointestinal:  Soft, Non-tender, Non-distended, Normal bowel sounds    Musculoskeletal:  Normal range of motion, Normal strength.    Integumentary:  Warm, Dry, Pink.    Neurologic:  Alert, Oriented.    Psychiatric:  Cooperative.      Assessment:       ICD-10-CM ICD-9-CM   1. Encounter for vitamin deficiency screening  Z13.21 V77.99   2. " Screening, anemia, deficiency, iron  Z13.0 V78.0   3. Electrolyte imbalance risk  Z91.89 V49.89   4. Encounter for weight loss counseling  Z71.3 V65.3   5. Dietary counseling  Z71.3 V65.3   6. Morbid obesity  E66.01 278.01   7. Dehydration  E86.0 276.51   8. Dysphagia, unspecified type  R13.10 787.20     Plan:     1. Encounter for vitamin deficiency screening  Vitamin B1, WB    Vitamin B12      2. Screening, anemia, deficiency, iron  Iron and TIBC    CBC Auto Differential      3. Electrolyte imbalance risk  Comprehensive Metabolic Panel      4. Encounter for weight loss counseling        5. Dietary counseling        6. Morbid obesity        7. Dehydration        8. Dysphagia, unspecified type          Plan:  - discussed with patient that she needs to just avoid chicken for now since it seems to be the only protein that is giving her some real trouble going down.    - rx of levsin for pt just in case it would help with the sensation   - practice all bariatric eating behaviors.    - banana bag today for possible dehydration.   - pt understands that she needs to increase intensity of exercise. Discuss with exercise physiologist what is appropriate at this time.   - F/U 4 months with bariatric labs  - Continue diet  - Continue vitamin supplementation  - Support group attendance encouraged  - Keep routine appts with PCP/specialists as scheduled

## 2024-11-23 LAB — VIT B1 BLD-SCNC: 55 NMOL/L (ref 70–180)

## 2024-11-25 ENCOUNTER — OFFICE VISIT (OUTPATIENT)
Dept: SURGERY | Facility: CLINIC | Age: 39
End: 2024-11-25
Payer: MEDICAID

## 2024-11-25 ENCOUNTER — TELEPHONE (OUTPATIENT)
Dept: SURGERY | Facility: CLINIC | Age: 39
End: 2024-11-25

## 2024-11-25 DIAGNOSIS — R51.9 NONINTRACTABLE HEADACHE, UNSPECIFIED CHRONICITY PATTERN, UNSPECIFIED HEADACHE TYPE: Primary | ICD-10-CM

## 2024-11-25 DIAGNOSIS — Z90.3 S/P GASTRIC SLEEVE PROCEDURE: ICD-10-CM

## 2024-11-25 DIAGNOSIS — E51.9 THIAMINE DEFICIENCY: ICD-10-CM

## 2024-11-25 RX ORDER — LANOLIN ALCOHOL/MO/W.PET/CERES
100 CREAM (GRAM) TOPICAL 3 TIMES DAILY
Qty: 90 TABLET | Refills: 3 | Status: SHIPPED | OUTPATIENT
Start: 2024-11-25 | End: 2025-03-25

## 2024-11-25 RX ORDER — THIAMINE HYDROCHLORIDE 100 MG/ML
200 INJECTION, SOLUTION INTRAMUSCULAR; INTRAVENOUS ONCE
Status: COMPLETED | OUTPATIENT
Start: 2024-11-25 | End: 2024-11-25

## 2024-11-25 RX ADMIN — THIAMINE HYDROCHLORIDE 200 MG: 100 INJECTION, SOLUTION INTRAMUSCULAR; INTRAVENOUS at 12:11

## 2024-11-25 NOTE — PROGRESS NOTES
1. call p to come in for thiamine injection   2. Sending thiamine to her pharmacy   3. Repeat thiamine in 1mth

## 2024-11-25 NOTE — PROGRESS NOTES
Patient ID: [id number]     Chief Complaint: needs injection      HPI:     Sonia Stock is a 39 y.o. female with hx of VSG. Lab work continues to reveal thiamine deficiency. Denies any symptoms or issues at this time.     ROS:  General: alert  cardio: no palpitations, chest pain  neuro: oriented x's 4, no numbness, paresthesia, forgetfulness. +headache today. Has not taken any medication for it today  All Other ROS: Negative except as stated in HPI.     PE:   Neuro: steady gait, appropriate sentences. Tolerated injection well. Band-Aid applied to injection sites.     Assessment:       ICD-10-CM ICD-9-CM   1. Nonintractable headache, unspecified chronicity pattern, unspecified headache type  R51.9 784.0   2. Thiamine deficiency  E51.9 265.1   3. S/P gastric sleeve procedure  Z90.3 V45.75        Lab Frequency Next Occurrence   Vitamin B1, WB Once 11/21/2024   Vitamin B12 Once 11/21/2024   Iron and TIBC Once 11/21/2024   Comprehensive Metabolic Panel Once 11/21/2024   CBC Auto Differential Once 11/21/2024   Ambulate In Room (bathroom) As needed         Plan:     1. Nonintractable headache, unspecified chronicity pattern, unspecified headache type    2. Thiamine deficiency    3. S/P gastric sleeve procedure      - Thiamine injection given in clinic today.   - labs in 1mth to repeat b1 level  - continue all bariatric multivitamins/calcium and continue B50 and B12 supplement to one tab BID with multivitamin   - sent Rx for thiamine to pt's pharmacy for TID  - f/u with normal bariatric schedule or as needed.

## 2024-11-25 NOTE — TELEPHONE ENCOUNTER
----- Message from LULU Quintero sent at 11/25/2024 10:54 AM CST -----  1. call p to come in for thiamine injection   2. Sending thiamine to her pharmacy   3. Repeat thiamine in 1mth

## 2024-12-17 ENCOUNTER — TELEPHONE (OUTPATIENT)
Dept: SURGERY | Facility: CLINIC | Age: 39
End: 2024-12-17
Payer: MEDICAID

## 2024-12-18 NOTE — PROGRESS NOTES
"Progress Note  Sonia Stock  Chief Complaint   Patient presents with    Follow-up     VSG 09/18/24     History of Present Illness:  Ms. Sonia Stock is a 38 y.o. female who is  s/p Lap Sleeve Gastrectomy on 9/18/24 by Dr. Eric Al.   Pathology benign.      Admit and Discharge Diagnosis:   Morbid Obesity BMI 48.24     Procedure: Laparoscopic Vertical Sleeve Gastrectomy per Dr. Eric Al 9/18/2024     Presents today with complaints of   "Feeling weird" sure if it is her iron or not.     - fatigue, has not gotten her water down lately. Able to eat, no vomiting since the last time she spoke. Has been taking her thiamine three times a day. Asking about IV fluids today      Diet: compliant with bariatric meal plan for the most part  Exercise: regular exercise, walking and weight training.   Vitamins: compliant with bariatric supplementation per protocol     Ht: 65in  Weights:   Trend Weights BMI   Starting 293# 48   Pre-Op 286# 47   3 Week  264 #   44   2 Month 255#  41   3 Month  244# 39    1 Year       2 Year                          Pertinent History:  HTN - [x] Yes   [] No    [] Resolved  HLD - [] Yes   [x] No    [] Resolved  DM  -  [] Yes   [x] No    [] Resolved  VANNESSA - [] Yes   [x] No   [] Resolved  GERD - [] Yes   [x] No [] Resolved  Anticoagulation- [] Yes   [x] No      If yes, type: [] ASA [] Plavix [] Other    Patient Care Team:  No Ag NP as PCP - General (Urgent Care)  Eric Al MD as Surgeon (Bariatrics)     Subjective:     12 point review of systems conducted, negative except as stated in the history of present illness. See HPI for details.    Review of patient's allergies indicates:   Allergen Reactions    Aspirin Hives    Azithromycin Rash     Past Medical History:   Diagnosis Date    Hypertension     Left bundle branch block     Obesity      Past Surgical History:   Procedure Laterality Date    BILATERAL TUBAL LIGATION      CHOLECYSTECTOMY      " "DILATION AND CURETTAGE OF UTERUS      ESOPHAGOGASTRODUODENOSCOPY N/A 11/17/2023    Procedure: EGD (ESOPHAGOGASTRODUODENOSCOPY);  Surgeon: Eric Al MD;  Location: [facility] OR;  Service: General;  Laterality: N/A;    LAPAROSCOPIC SLEEVE GASTRECTOMY N/A 9/18/2024    Procedure: GASTRECTOMY, SLEEVE, LAPAROSCOPIC;  Surgeon: Eric Al MD;  Location: [facility] OR;  Service: General;  Laterality: N/A;    TUBAL LIGATION       No family history on file.  Social History     Tobacco Use    Smoking status: Never    Smokeless tobacco: Never   Substance Use Topics    Alcohol use: Yes      Current Outpatient Medications   Medication Instructions    atorvastatin (LIPITOR) 40 mg, Daily    calcium carbonate/vitamin D3 (CALTRATE 600 + D ORAL) Take by mouth.    ferrous sulfate (FEOSOL) Tab tablet 1 tablet, With breakfast    hyoscyamine 0.125 mg, Sublingual, Every 6 hours PRN    pantoprazole (PROTONIX) 40 mg, Oral, Daily, Take once daily for first 30 days post op.    pediatric multivitamin no.76 (FLINTSTONES COMPLETE ORAL) Take by mouth.    thiamine 100 mg, Oral, 3 times daily       Objective:     Vital Signs (Most Recent):  Visit Vitals  /86   Pulse 72   Ht 5' 6" (1.676 m)   Wt 110.8 kg (244 lb 3.2 oz)   BMI 39.41 kg/m²       Physical Exam:  General:  Alert and oriented.    Respiratory:  Lungs are clear to auscultation, Respirations are non-labored, Breath sounds are equal.    Cardiovascular:  Normal rate, Regular rhythm, No murmur.    Gastrointestinal:  Soft, Non-tender, Non-distended, Normal bowel sounds        Musculoskeletal:  Normal range of motion, Normal strength.    Integumentary:  Warm, Dry, Pink.    Neurologic:  Alert, Oriented.    Psychiatric:  Cooperative.      Assessment:       ICD-10-CM ICD-9-CM   1. S/P gastric sleeve procedure  Z90.3 V45.75   2. Dietary counseling  Z71.3 V65.3   3. Exercise counseling  Z71.82 V65.41   4. Morbid obesity  E66.01 278.01   5. Encounter for weight loss counseling  Z71.3 V65.3 "   6. Thiamine deficiency  E51.9 265.1   7. Fatigue, unspecified type  R53.83 780.79       Plan:     1. S/P gastric sleeve procedure        2. Dietary counseling        3. Exercise counseling        4. Morbid obesity        5. Encounter for weight loss counseling        6. Thiamine deficiency        7. Fatigue, unspecified type          - banana bag today for dehydration and fatigue.   - IM thiamine injection   - will repeat thiamine in 1mth.  - Discussed possible surgical risks with patient that can occur at any point in time such as but not limited to vitamin and mineral deficiency, ulceration from smoking/NSAIDs/Anti-inflammatory medications, gallbladder disfunction, etc. If patient has any severe abd pain that is constant, nausea, vomiting, disruption of bowel movements, or has other concerns they are instructed to call the office or present to the emergency room. Pt verbalized understanding   - F/U 1 year with bariatric labs (annually)  - Continue exercising and following bariatric vitamin supplementation  - Support group attendance encouraged   - Keep routine appts with PCP/specialists as scheduled

## 2024-12-20 ENCOUNTER — OFFICE VISIT (OUTPATIENT)
Dept: SURGERY | Facility: CLINIC | Age: 39
End: 2024-12-20
Payer: MEDICAID

## 2024-12-20 ENCOUNTER — INFUSION (OUTPATIENT)
Dept: INFUSION THERAPY | Facility: HOSPITAL | Age: 39
End: 2024-12-20
Attending: NURSE PRACTITIONER
Payer: MEDICAID

## 2024-12-20 VITALS
SYSTOLIC BLOOD PRESSURE: 143 MMHG | HEART RATE: 58 BPM | SYSTOLIC BLOOD PRESSURE: 133 MMHG | BODY MASS INDEX: 39.24 KG/M2 | DIASTOLIC BLOOD PRESSURE: 86 MMHG | HEIGHT: 66 IN | BODY MASS INDEX: 39.24 KG/M2 | WEIGHT: 244.19 LBS | HEIGHT: 66 IN | WEIGHT: 244.19 LBS | RESPIRATION RATE: 18 BRPM | OXYGEN SATURATION: 97 % | HEART RATE: 72 BPM | TEMPERATURE: 98 F | DIASTOLIC BLOOD PRESSURE: 96 MMHG

## 2024-12-20 DIAGNOSIS — Z90.3 S/P GASTRIC SLEEVE PROCEDURE: Primary | ICD-10-CM

## 2024-12-20 DIAGNOSIS — Z71.82 EXERCISE COUNSELING: ICD-10-CM

## 2024-12-20 DIAGNOSIS — E51.9 THIAMINE DEFICIENCY: ICD-10-CM

## 2024-12-20 DIAGNOSIS — Z71.3 ENCOUNTER FOR WEIGHT LOSS COUNSELING: ICD-10-CM

## 2024-12-20 DIAGNOSIS — R53.83 FATIGUE, UNSPECIFIED TYPE: ICD-10-CM

## 2024-12-20 DIAGNOSIS — E66.01 MORBID OBESITY: ICD-10-CM

## 2024-12-20 DIAGNOSIS — Z71.3 DIETARY COUNSELING: ICD-10-CM

## 2024-12-20 DIAGNOSIS — E86.0 DEHYDRATION: Primary | ICD-10-CM

## 2024-12-20 PROCEDURE — 96366 THER/PROPH/DIAG IV INF ADDON: CPT

## 2024-12-20 PROCEDURE — 63600175 PHARM REV CODE 636 W HCPCS: Performed by: NURSE PRACTITIONER

## 2024-12-20 PROCEDURE — 96365 THER/PROPH/DIAG IV INF INIT: CPT

## 2024-12-20 PROCEDURE — 25000003 PHARM REV CODE 250: Performed by: NURSE PRACTITIONER

## 2024-12-20 RX ORDER — THIAMINE HYDROCHLORIDE 100 MG/ML
200 INJECTION, SOLUTION INTRAMUSCULAR; INTRAVENOUS ONCE
Status: COMPLETED | OUTPATIENT
Start: 2024-12-20 | End: 2024-12-20

## 2024-12-20 RX ADMIN — FOLIC ACID: 5 INJECTION, SOLUTION INTRAMUSCULAR; INTRAVENOUS; SUBCUTANEOUS at 10:12

## 2024-12-20 RX ADMIN — THIAMINE HYDROCHLORIDE 200 MG: 100 INJECTION, SOLUTION INTRAMUSCULAR; INTRAVENOUS at 09:12

## 2024-12-20 NOTE — PLAN OF CARE
Plan of care reviewed with patient; patient in agreement. 1L MVI completed. Orders completed; no future appts for infusion scheduled. Patient uses portal for follow up with MD.

## 2024-12-23 ENCOUNTER — TELEPHONE (OUTPATIENT)
Dept: SURGERY | Facility: CLINIC | Age: 39
End: 2024-12-23
Payer: MEDICAID

## 2024-12-23 DIAGNOSIS — Z13.21 ENCOUNTER FOR VITAMIN DEFICIENCY SCREENING: Primary | ICD-10-CM

## 2024-12-23 NOTE — TELEPHONE ENCOUNTER
Patient aware that she needs to have B1 completed she will go to an [facility] facility     Placing orders

## 2024-12-23 NOTE — TELEPHONE ENCOUNTER
----- Message from FAIZAN Mukherjee sent at 11/27/2024  8:18 AM CST -----  Regarding: thiamine   Repeat thiamine in 1mth

## 2025-01-02 ENCOUNTER — TELEPHONE (OUTPATIENT)
Dept: SURGERY | Facility: CLINIC | Age: 40
End: 2025-01-02
Payer: MEDICAID

## 2025-01-02 NOTE — TELEPHONE ENCOUNTER
Spoke with patient she will be going to do her labs at a  later date she does not currently feel well   Well follow up ----- Message from FAIZAN Mukherjee sent at 11/27/2024  8:18 AM CST -----  Regarding: thiamine   Repeat thiamine in 1mth

## 2025-01-09 ENCOUNTER — PATIENT MESSAGE (OUTPATIENT)
Dept: SURGERY | Facility: CLINIC | Age: 40
End: 2025-01-09
Payer: MEDICAID

## 2025-01-09 ENCOUNTER — LAB VISIT (OUTPATIENT)
Dept: LAB | Facility: HOSPITAL | Age: 40
End: 2025-01-09
Attending: SURGERY
Payer: MEDICAID

## 2025-01-09 DIAGNOSIS — Z91.89 ELECTROLYTE IMBALANCE RISK: ICD-10-CM

## 2025-01-09 DIAGNOSIS — Z13.0 SCREENING, ANEMIA, DEFICIENCY, IRON: ICD-10-CM

## 2025-01-09 DIAGNOSIS — Z13.21 ENCOUNTER FOR VITAMIN DEFICIENCY SCREENING: ICD-10-CM

## 2025-01-09 LAB
ALBUMIN SERPL-MCNC: 4.3 G/DL (ref 3.5–5)
ALBUMIN/GLOB SERPL: 1.2 RATIO (ref 1.1–2)
ALP SERPL-CCNC: 68 UNIT/L (ref 40–150)
ALT SERPL-CCNC: 7 UNIT/L (ref 0–55)
ANION GAP SERPL CALC-SCNC: 9 MEQ/L
AST SERPL-CCNC: 16 UNIT/L (ref 5–34)
BASOPHILS # BLD AUTO: 0.04 X10(3)/MCL
BASOPHILS NFR BLD AUTO: 0.6 %
BILIRUB SERPL-MCNC: 0.5 MG/DL
BUN SERPL-MCNC: 6.2 MG/DL (ref 7–18.7)
CALCIUM SERPL-MCNC: 9.9 MG/DL (ref 8.4–10.2)
CHLORIDE SERPL-SCNC: 103 MMOL/L (ref 98–107)
CO2 SERPL-SCNC: 27 MMOL/L (ref 22–29)
CREAT SERPL-MCNC: 0.88 MG/DL (ref 0.55–1.02)
CREAT/UREA NIT SERPL: 7
EOSINOPHIL # BLD AUTO: 0.07 X10(3)/MCL (ref 0–0.9)
EOSINOPHIL NFR BLD AUTO: 1.1 %
ERYTHROCYTE [DISTWIDTH] IN BLOOD BY AUTOMATED COUNT: 13 % (ref 11.5–17)
GFR SERPLBLD CREATININE-BSD FMLA CKD-EPI: >60 ML/MIN/1.73/M2
GLOBULIN SER-MCNC: 3.5 GM/DL (ref 2.4–3.5)
GLUCOSE SERPL-MCNC: 107 MG/DL (ref 74–100)
HCT VFR BLD AUTO: 41.1 % (ref 37–47)
HGB BLD-MCNC: 13.2 G/DL (ref 12–16)
IMM GRANULOCYTES # BLD AUTO: 0.01 X10(3)/MCL (ref 0–0.04)
IMM GRANULOCYTES NFR BLD AUTO: 0.2 %
IRON SATN MFR SERPL: 42 % (ref 20–50)
IRON SERPL-MCNC: 111 UG/DL (ref 50–170)
LYMPHOCYTES # BLD AUTO: 2.2 X10(3)/MCL (ref 0.6–4.6)
LYMPHOCYTES NFR BLD AUTO: 33.8 %
MCH RBC QN AUTO: 28.1 PG (ref 27–31)
MCHC RBC AUTO-ENTMCNC: 32.1 G/DL (ref 33–36)
MCV RBC AUTO: 87.6 FL (ref 80–94)
MONOCYTES # BLD AUTO: 0.38 X10(3)/MCL (ref 0.1–1.3)
MONOCYTES NFR BLD AUTO: 5.8 %
NEUTROPHILS # BLD AUTO: 3.81 X10(3)/MCL (ref 2.1–9.2)
NEUTROPHILS NFR BLD AUTO: 58.5 %
NRBC BLD AUTO-RTO: 0 %
PLATELET # BLD AUTO: 381 X10(3)/MCL (ref 130–400)
PMV BLD AUTO: 8.6 FL (ref 7.4–10.4)
POTASSIUM SERPL-SCNC: 3.5 MMOL/L (ref 3.5–5.1)
PROT SERPL-MCNC: 7.8 GM/DL (ref 6.4–8.3)
RBC # BLD AUTO: 4.69 X10(6)/MCL (ref 4.2–5.4)
SODIUM SERPL-SCNC: 139 MMOL/L (ref 136–145)
TIBC SERPL-MCNC: 152 UG/DL (ref 70–310)
TIBC SERPL-MCNC: 263 UG/DL (ref 250–450)
TRANSFERRIN SERPL-MCNC: 229 MG/DL (ref 180–382)
VIT B12 SERPL-MCNC: 769 PG/ML (ref 213–816)
WBC # BLD AUTO: 6.51 X10(3)/MCL (ref 4.5–11.5)

## 2025-01-09 PROCEDURE — 85025 COMPLETE CBC W/AUTO DIFF WBC: CPT

## 2025-01-09 PROCEDURE — 82607 VITAMIN B-12: CPT

## 2025-01-09 PROCEDURE — 83550 IRON BINDING TEST: CPT

## 2025-01-09 PROCEDURE — 80053 COMPREHEN METABOLIC PANEL: CPT

## 2025-01-09 PROCEDURE — 36415 COLL VENOUS BLD VENIPUNCTURE: CPT

## 2025-01-09 PROCEDURE — 84425 ASSAY OF VITAMIN B-1: CPT

## 2025-01-13 NOTE — TELEPHONE ENCOUNTER
Called pt. All labs normal. Glucose mildly elevated but pt could not remember if it was fasting or not.

## 2025-01-16 LAB — VIT B1 BLD-SCNC: 148 NMOL/L (ref 70–180)

## 2025-01-17 ENCOUNTER — PATIENT MESSAGE (OUTPATIENT)
Dept: SURGERY | Facility: CLINIC | Age: 40
End: 2025-01-17
Payer: MEDICAID

## 2025-03-07 DIAGNOSIS — N84.1 ENDOCERVICAL POLYP: Primary | ICD-10-CM

## 2025-03-10 ENCOUNTER — PATIENT MESSAGE (OUTPATIENT)
Dept: SURGERY | Facility: CLINIC | Age: 40
End: 2025-03-10
Payer: MEDICAID

## 2025-03-12 ENCOUNTER — RESULTS FOLLOW-UP (OUTPATIENT)
Dept: SURGERY | Facility: CLINIC | Age: 40
End: 2025-03-12

## 2025-03-12 NOTE — PROGRESS NOTES
Duplicate message. Bri Blackmon NP already discussed results with patient at most recent appointment in Dec 2024.

## 2025-03-13 ENCOUNTER — TELEPHONE (OUTPATIENT)
Dept: SURGERY | Facility: CLINIC | Age: 40
End: 2025-03-13
Payer: MEDICAID

## 2025-03-13 NOTE — TELEPHONE ENCOUNTER
Spoke with patient regarding her labs for her upcoming appt, she stated that she forgot but she will go do them at an [facility] facility prior to her appt.     Will follow up on results

## 2025-03-17 ENCOUNTER — LAB VISIT (OUTPATIENT)
Dept: LAB | Facility: HOSPITAL | Age: 40
End: 2025-03-17
Attending: NURSE PRACTITIONER
Payer: MEDICAID

## 2025-03-17 DIAGNOSIS — Z13.21 ENCOUNTER FOR VITAMIN DEFICIENCY SCREENING: ICD-10-CM

## 2025-03-17 PROCEDURE — 84425 ASSAY OF VITAMIN B-1: CPT

## 2025-03-17 PROCEDURE — 36415 COLL VENOUS BLD VENIPUNCTURE: CPT

## 2025-03-18 NOTE — PROGRESS NOTES
Progress Note  Sonia Stock  Chief Complaint   Patient presents with    Follow-up     VSG 24- 6 month fu (Medicaid)       History of Present Illness:  Ms. Sonia Stock is a 39 y.o. female who is 6 mth s/p lap gastric sleeve surgery on 24 by Eric Al MD. Presents today for 6 mth follow up appt. No complaints with tolerating PO. No dysphagia, odynophagia, GERD, NV, or abd pain. Regular BMs.     Diet: compliant with bariatric meal plan  Exercise: regular exercise, walking   Vitamins: compliant with bariatric supplementation         Labs (25): WNLs.     Ht: 65in  Weights:   Trend Weights BMI   Starting 293# 48   Pre-Op 286# 47   3 Week  264 #   44   2 Month 255#  41   3 Month  244# 39    1 Year 233#   37   2 Year                          Pertinent History:  HTN - [x] Yes   [] No    [] Resolved  HLD - [] Yes   [x] No    [] Resolved  DM  -  [] Yes   [x] No    [] Resolved  VANNESSA - [] Yes   [x] No   [] Resolved  GERD - [] Yes   [x] No [] Resolved  Anticoagulation- [] Yes   [x] No      If yes, type: [] ASA [] Plavix [] Other    Patient Care Team:  No Ag NP as PCP - General (Urgent Care)  Eric Al MD as Surgeon (Bariatrics)     Subjective:     12 point review of systems conducted, negative except as stated in the history of present illness. See HPI for details.    Review of patient's allergies indicates:   Allergen Reactions    Aspirin Hives    Azithromycin Rash     Past Medical History:   Diagnosis Date    Allergy     Hypertension     Left bundle branch block     Obesity      Past Surgical History:   Procedure Laterality Date    BILATERAL TUBAL LIGATION       SECTION      CHOLECYSTECTOMY      DILATION AND CURETTAGE OF UTERUS      ESOPHAGOGASTRODUODENOSCOPY N/A 2023    Procedure: EGD (ESOPHAGOGASTRODUODENOSCOPY);  Surgeon: Eric Al MD;  Location: [facility];  Service: General;  Laterality: N/A;    LAPAROSCOPIC SLEEVE GASTRECTOMY N/A  "09/18/2024    Procedure: GASTRECTOMY, SLEEVE, LAPAROSCOPIC;  Surgeon: Eric Al MD;  Location: [facility];  Service: General;  Laterality: N/A;    TUBAL LIGATION       Family History   Problem Relation Name Age of Onset    Diabetes Mother Fallon qiu     Hypertension Mother Fallon iqu     Stroke Mother Fallon qiu     Hypertension Father Sai qiu      Social History[1]   Current Outpatient Medications   Medication Instructions    atorvastatin (LIPITOR) 40 mg, Daily    calcium carbonate/vitamin D3 (CALTRATE 600 + D ORAL) Take by mouth.    ferrous sulfate (FEOSOL) Tab tablet 1 tablet, With breakfast    pantoprazole (PROTONIX) 40 mg, Oral, Daily, Take once daily for first 30 days post op.    pediatric multivitamin no.76 (FLINTSTONES COMPLETE ORAL) Take by mouth.    thiamine 100 mg, Oral, 3 times daily       Objective:     Vital Signs (Most Recent):  Visit Vitals  /75   Pulse (!) 53   Ht 5' 6" (1.676 m)   Wt 105.7 kg (233 lb)   BMI 37.61 kg/m²       Physical Exam:  General:  Alert and oriented.    Respiratory:  Lungs are clear to auscultation, Respirations are non-labored, Breath sounds are equal.    Cardiovascular:  Normal rate, Regular rhythm, No murmur.    Gastrointestinal:  Soft, Non-tender, Non-distended, Normal bowel sounds    Musculoskeletal:  Normal range of motion, Normal strength.    Integumentary:  Warm, Dry, Pink.    Neurologic:  Alert, Oriented.    Psychiatric:  Cooperative.      Assessment:       ICD-10-CM ICD-9-CM   1. S/P gastric sleeve procedure  Z90.3 V45.75   2. Exercise counseling  Z71.82 V65.41   3. Dietary counseling  Z71.3 V65.3   4. Encounter for weight loss counseling  Z71.3 V65.3   5. Obesity, unspecified class, unspecified obesity type, unspecified whether serious comorbidity present  E66.9 278.00     Plan:     1. S/P gastric sleeve procedure        2. Exercise counseling        3. Dietary counseling        4. Encounter for weight loss counseling        5. Obesity, " unspecified class, unspecified obesity type, unspecified whether serious comorbidity present              - Discussed common downfalls of patients that gain weight after their first year such as adding in carbs too soon, popcorn or other snack type foods rather than eating protein forward meals, increasing nut intake, or not measuring amount when eating nuts since this can be very high in calories, as well as falling back into previous bad habits. Pt verbalized understanding of some of the issues her is doing wrong and will try and correct these.       - need to increase exercise.   - F/U 6 months with bariatric labs  - Continue diet  - Continue vitamin supplementation  - Support group attendance encouraged  - Keep routine appts with PCP/specialists as scheduled                [1]   Social History  Tobacco Use    Smoking status: Never    Smokeless tobacco: Never   Substance Use Topics    Alcohol use: Yes    Drug use: Never

## 2025-03-19 ENCOUNTER — RESULTS FOLLOW-UP (OUTPATIENT)
Dept: SURGERY | Facility: CLINIC | Age: 40
End: 2025-03-19

## 2025-03-19 LAB — VIT B1 BLD-SCNC: 98 NMOL/L (ref 70–180)

## 2025-03-20 ENCOUNTER — CLINICAL SUPPORT (OUTPATIENT)
Dept: BARIATRICS | Facility: HOSPITAL | Age: 40
End: 2025-03-20

## 2025-03-20 ENCOUNTER — PATIENT MESSAGE (OUTPATIENT)
Dept: SURGERY | Facility: CLINIC | Age: 40
End: 2025-03-20

## 2025-03-20 ENCOUNTER — OFFICE VISIT (OUTPATIENT)
Dept: SURGERY | Facility: CLINIC | Age: 40
End: 2025-03-20
Payer: MEDICAID

## 2025-03-20 VITALS
BODY MASS INDEX: 37.45 KG/M2 | SYSTOLIC BLOOD PRESSURE: 111 MMHG | HEART RATE: 53 BPM | WEIGHT: 233 LBS | HEIGHT: 66 IN | BODY MASS INDEX: 37.78 KG/M2 | DIASTOLIC BLOOD PRESSURE: 75 MMHG | WEIGHT: 234.13 LBS

## 2025-03-20 DIAGNOSIS — Z71.82 EXERCISE COUNSELING: ICD-10-CM

## 2025-03-20 DIAGNOSIS — Z71.3 DIETARY COUNSELING: ICD-10-CM

## 2025-03-20 DIAGNOSIS — Z71.3 ENCOUNTER FOR WEIGHT LOSS COUNSELING: ICD-10-CM

## 2025-03-20 DIAGNOSIS — E66.9 OBESITY: Primary | ICD-10-CM

## 2025-03-20 DIAGNOSIS — Z98.84 HISTORY OF BARIATRIC SURGERY: Primary | ICD-10-CM

## 2025-03-20 DIAGNOSIS — E66.9 OBESITY, UNSPECIFIED CLASS, UNSPECIFIED OBESITY TYPE, UNSPECIFIED WHETHER SERIOUS COMORBIDITY PRESENT: ICD-10-CM

## 2025-03-20 DIAGNOSIS — Z90.3 S/P GASTRIC SLEEVE PROCEDURE: Primary | ICD-10-CM

## 2025-03-20 PROCEDURE — 3074F SYST BP LT 130 MM HG: CPT | Mod: CPTII,,, | Performed by: NURSE PRACTITIONER

## 2025-03-20 PROCEDURE — 99214 OFFICE O/P EST MOD 30 MIN: CPT | Mod: ,,, | Performed by: NURSE PRACTITIONER

## 2025-03-20 PROCEDURE — 3078F DIAST BP <80 MM HG: CPT | Mod: CPTII,,, | Performed by: NURSE PRACTITIONER

## 2025-03-20 PROCEDURE — 3008F BODY MASS INDEX DOCD: CPT | Mod: CPTII,,, | Performed by: NURSE PRACTITIONER

## 2025-03-20 NOTE — PROGRESS NOTES
"POST OP BARIATRIC NUTRITION FOLLOW UP    Type of surgery: sleeve gastrectomy  Post-op visit:   [] 2 weeks  [] 4 weeks:  [] 2 months:  [] 4 months:  [x] 6 months:  [] 9 months:  [] 1 year:   [] Other:     Height:   Ht Readings from Last 1 Encounters:   12/20/24 5' 6" (1.676 m)      Weight:   Wt Readings from Last 3 Encounters:   03/20/25 0913 106.2 kg (234 lb 1.6 oz)   12/20/24 0944 110.8 kg (244 lb 3.2 oz)   12/20/24 0901 110.8 kg (244 lb 3.2 oz)      BMI:   BMI Readings from Last 1 Encounters:   03/20/25 37.78 kg/m²       Post op complications:   [] Yes [x] No  If yes: [] Nausea   [] Vomiting   [] Constipation    [] Diarrhea    [] Other:     Dietary tolerance:  [x] Yes [] No [] Comment:     Adequate fluid intake:  [x] Yes [] No Approx. daily fluid intake: 50-70 oz  Adequate protein intake:  [x] Yes [] No  Approx. daily protein intake:  g    Vitamins/Minerals:   [] MVI:    [] Biotin:  [] Calcium:    [] Hair/Nails:  [] B 50 complex:   [x] Bariatric Specific MVI:  [] B12:    [] Bariatric Specific Calcium:  [] Iron:    [] Other:   [x] Non-compliance:      Labs:   WNL labs in January (2 months ago), recent B1 lab normal  Comment:    Expected compliance:  [x]Good  []Fair  []Poor    Progress:   [x]Patient progressing well at this time with no complaints   [] Patient expressed complaint at this time: See additional notes     Bariatric Diet Education:   Verbalizes understanding Demonstrates  Needs further teaching Needs practice/ supervision Comments    Bariatric Clear [] [] [] []    Bariatric Full [] [] [] []    Bariatric Puree [] [] [] []    Bariatric Soft [] [] [] []    Bariatric Regular [] [] [] []    Bariatric Reintroduction of Starchy CHO [x] [] [] []    Bariatric: Mindful Eating [x] [] [] []    Importance of Protein and Vitamin Protocol [x] [] [] []    Other:          Additional Notes:   Pt doing well meeting her protein goal daily. She says she struggles with water but still gets at least 50 oz in- rec' to " increase to 70 oz at least daily. Pt's labs are WNL- rec'd adding Calcium supplement to her regimen along with her Bariatric MVI she is already taking. Educated pt on stage 6 bariatric lifelong diet and limiting starches to one serving (1/3 cup daily).

## 2025-03-20 NOTE — PROGRESS NOTES
A 6 month F/U was conducted with Sonia. Current weight is 234 lbs.  Patient has lost 54 lbs. Since her preop class. A body composition was conducted and patient was educated on results. Patient's goal weight is 170 lbs. She is walking around a track in a park 3x/week, 6 laps. She reports that she doesn't have cravings. She does have a weight loss stall. Patient was educated.          PLAN:    - Advised patient to continue walking and start weight training.  - Patient will follow dietary advice from SALOMON Manuel RD.      It is my professional opinion that patient is in the action phase of behavior change.      SHANA Barnett, CPT, CHC

## 2025-04-14 ENCOUNTER — HOSPITAL ENCOUNTER (OUTPATIENT)
Dept: RADIOLOGY | Facility: HOSPITAL | Age: 40
Discharge: HOME | End: 2025-04-14
Attending: NURSE PRACTITIONER
Payer: MEDICAID

## 2025-04-14 DIAGNOSIS — N63.10 MASS OF RIGHT BREAST: ICD-10-CM

## 2025-04-14 PROCEDURE — 77062 BREAST TOMOSYNTHESIS BI: CPT | Mod: TC

## 2025-04-14 PROCEDURE — 76642 ULTRASOUND BREAST LIMITED: CPT | Mod: TC,RT

## 2025-04-14 PROCEDURE — 77066 DX MAMMO INCL CAD BI: CPT | Mod: 26,,, | Performed by: STUDENT IN AN ORGANIZED HEALTH CARE EDUCATION/TRAINING PROGRAM

## 2025-04-14 PROCEDURE — 76642 ULTRASOUND BREAST LIMITED: CPT | Mod: 26,RT,, | Performed by: STUDENT IN AN ORGANIZED HEALTH CARE EDUCATION/TRAINING PROGRAM

## 2025-04-14 PROCEDURE — 77062 BREAST TOMOSYNTHESIS BI: CPT | Mod: 26,,, | Performed by: STUDENT IN AN ORGANIZED HEALTH CARE EDUCATION/TRAINING PROGRAM

## 2025-08-06 ENCOUNTER — CLINICAL SUPPORT (OUTPATIENT)
Dept: SURGERY | Facility: CLINIC | Age: 40
End: 2025-08-06
Payer: MEDICAID

## 2025-08-06 ENCOUNTER — TELEPHONE (OUTPATIENT)
Dept: SURGERY | Facility: CLINIC | Age: 40
End: 2025-08-06
Payer: MEDICAID

## 2025-08-06 ENCOUNTER — HOSPITAL ENCOUNTER (OUTPATIENT)
Dept: RADIOLOGY | Facility: HOSPITAL | Age: 40
Discharge: HOME | End: 2025-08-06
Attending: SURGERY
Payer: MEDICAID

## 2025-08-06 ENCOUNTER — INFUSION (OUTPATIENT)
Dept: INFUSION THERAPY | Facility: HOSPITAL | Age: 40
End: 2025-08-06
Attending: NURSE PRACTITIONER
Payer: MEDICAID

## 2025-08-06 VITALS
HEIGHT: 66 IN | OXYGEN SATURATION: 100 % | BODY MASS INDEX: 37.45 KG/M2 | RESPIRATION RATE: 16 BRPM | BODY MASS INDEX: 37.71 KG/M2 | WEIGHT: 233 LBS | SYSTOLIC BLOOD PRESSURE: 116 MMHG | WEIGHT: 234.63 LBS | HEIGHT: 66 IN | TEMPERATURE: 99 F | DIASTOLIC BLOOD PRESSURE: 84 MMHG | HEART RATE: 52 BPM

## 2025-08-06 DIAGNOSIS — Z91.89 ELECTROLYTE IMBALANCE RISK: Primary | ICD-10-CM

## 2025-08-06 DIAGNOSIS — K21.9 GASTROESOPHAGEAL REFLUX DISEASE, UNSPECIFIED WHETHER ESOPHAGITIS PRESENT: ICD-10-CM

## 2025-08-06 DIAGNOSIS — E86.0 DEHYDRATION: Primary | ICD-10-CM

## 2025-08-06 DIAGNOSIS — R11.2 NAUSEA AND VOMITING, UNSPECIFIED VOMITING TYPE: ICD-10-CM

## 2025-08-06 DIAGNOSIS — Z13.21 ENCOUNTER FOR VITAMIN DEFICIENCY SCREENING: ICD-10-CM

## 2025-08-06 DIAGNOSIS — Z13.0 SCREENING, ANEMIA, DEFICIENCY, IRON: ICD-10-CM

## 2025-08-06 DIAGNOSIS — K21.9 GASTROESOPHAGEAL REFLUX DISEASE, UNSPECIFIED WHETHER ESOPHAGITIS PRESENT: Primary | ICD-10-CM

## 2025-08-06 PROCEDURE — 63600175 PHARM REV CODE 636 W HCPCS: Performed by: SURGERY

## 2025-08-06 PROCEDURE — 96365 THER/PROPH/DIAG IV INF INIT: CPT

## 2025-08-06 PROCEDURE — 25000003 PHARM REV CODE 250: Performed by: SURGERY

## 2025-08-06 RX ORDER — THIAMINE HYDROCHLORIDE 100 MG/ML
200 INJECTION, SOLUTION INTRAMUSCULAR; INTRAVENOUS
Status: COMPLETED | OUTPATIENT
Start: 2025-08-06 | End: 2025-08-06

## 2025-08-06 RX ADMIN — THIAMINE HYDROCHLORIDE 200 MG: 100 INJECTION, SOLUTION INTRAMUSCULAR; INTRAVENOUS at 10:08

## 2025-08-06 RX ADMIN — FOLIC ACID: 5 INJECTION, SOLUTION INTRAMUSCULAR; INTRAVENOUS; SUBCUTANEOUS at 11:08

## 2025-08-06 NOTE — TELEPHONE ENCOUNTER
Patient called this am stating that she is having numbness to her hands and both legs. She has generalized weakness and fatigue. She has a history of low B1. Thiamine was given and banana bag ordered. I will discuss with Dr Al    Pt showed up at the office about 30 minutes later stating she does not feel good. She looks fatigued. I asked her if she has been tolerating her diet and she states that she has  been having issues with vomiting. When she described further it seems like she is having reflux and regurgitates when she lays down. I sent her down for some bariatric labs while we wait for Dr Al to respond regarding B1 injection.     Spoke with Dr Al and we will give a Thiamine injection and set her up for a banana bag. Will also order UGI and EGD.

## 2025-08-06 NOTE — PROGRESS NOTES
Pt finished drawing her barbi labs and returned to office for her Thiamine injection. I asked her if she has been eating ok and she says yes. She is tolerating her diet. She does vomit sometimes after eating and then other times it is just random. She has a regurgitation feeling when she lays down. Not sleeping well. She did also states that she has not taken her vitamins in about 2 1/2 weeks because she is out and had to order some more. By the time they come in she will have been without them for almost 4 weeks. I told her that she needs to order them from [employer] with express shipping so that she can get them in the next day or so.  That would explain why she is feeling bad. We discussed that she needs to take her vitamins as we discussed as they are important. She verbalized understanding. She will go get her banana bag after this appt and then her esophagram. We will FU on those results.     Will send dieticians a message to FU on her and make sure she is taking her vitamins appropriately, etc.

## 2025-08-08 ENCOUNTER — HOSPITAL ENCOUNTER (OUTPATIENT)
Dept: RADIOLOGY | Facility: HOSPITAL | Age: 40
Discharge: HOME | End: 2025-08-08
Payer: MEDICAID

## 2025-08-08 PROCEDURE — A9698 NON-RAD CONTRAST MATERIALNOC: HCPCS

## 2025-08-08 PROCEDURE — 25500020 PHARM REV CODE 255

## 2025-08-08 PROCEDURE — 74240 X-RAY XM UPR GI TRC 1CNTRST: CPT | Mod: TC

## 2025-08-08 RX ADMIN — BARIUM SULFATE 10 ML: 0.6 SUSPENSION ORAL at 09:08

## 2025-08-08 RX ADMIN — BARIUM SULFATE 10 ML: 980 POWDER, FOR SUSPENSION ORAL at 09:08

## 2025-08-11 ENCOUNTER — TELEPHONE (OUTPATIENT)
Dept: SURGERY | Facility: CLINIC | Age: 40
End: 2025-08-11

## 2025-08-19 ENCOUNTER — HOSPITAL ENCOUNTER (EMERGENCY)
Facility: HOSPITAL | Age: 40
Discharge: HOME | End: 2025-08-19
Attending: EMERGENCY MEDICINE
Payer: COMMERCIAL

## 2025-08-19 VITALS
BODY MASS INDEX: 34.71 KG/M2 | TEMPERATURE: 99 F | OXYGEN SATURATION: 100 % | WEIGHT: 229 LBS | SYSTOLIC BLOOD PRESSURE: 140 MMHG | DIASTOLIC BLOOD PRESSURE: 90 MMHG | HEIGHT: 68 IN | HEART RATE: 60 BPM | RESPIRATION RATE: 18 BRPM
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
## 2025-08-19 DIAGNOSIS — S46.912A LEFT SHOULDER STRAIN, INITIAL ENCOUNTER: ICD-10-CM

## 2025-08-19 DIAGNOSIS — S39.012A STRAIN OF LUMBAR REGION, INITIAL ENCOUNTER: ICD-10-CM

## 2025-08-19 DIAGNOSIS — S16.1XXA CERVICAL STRAIN, ACUTE, INITIAL ENCOUNTER: Primary | ICD-10-CM

## 2025-08-19 LAB — B-HCG UR QL: NEGATIVE

## 2025-08-19 PROCEDURE — 99284 EMERGENCY DEPT VISIT MOD MDM: CPT | Mod: 25

## 2025-08-19 PROCEDURE — 81025 URINE PREGNANCY TEST: CPT | Performed by: EMERGENCY MEDICINE

## 2025-08-19 PROCEDURE — 63600175 PHARM REV CODE 636 W HCPCS: Mod: JZ,TB | Performed by: EMERGENCY MEDICINE

## 2025-08-19 PROCEDURE — 96372 THER/PROPH/DIAG INJ SC/IM: CPT | Performed by: EMERGENCY MEDICINE

## 2025-08-19 RX ORDER — CYCLOBENZAPRINE HCL 10 MG
10 TABLET ORAL 3 TIMES DAILY PRN
Qty: 15 TABLET | Refills: 0 | Status: SHIPPED | OUTPATIENT
Start: 2025-08-19 | End: 2025-08-24

## 2025-08-19 RX ORDER — HYDROCODONE BITARTRATE AND ACETAMINOPHEN 7.5; 325 MG/1; MG/1
1 TABLET ORAL EVERY 6 HOURS PRN
Qty: 20 TABLET | Refills: 0 | Status: SHIPPED | OUTPATIENT
Start: 2025-08-19 | End: 2025-08-24

## 2025-08-19 RX ORDER — KETOROLAC TROMETHAMINE 10 MG/1
10 TABLET, FILM COATED ORAL EVERY 6 HOURS
Qty: 20 TABLET | Refills: 0 | Status: SHIPPED | OUTPATIENT
Start: 2025-08-19 | End: 2025-08-24

## 2025-08-19 RX ORDER — KETOROLAC TROMETHAMINE 30 MG/ML
60 INJECTION, SOLUTION INTRAMUSCULAR; INTRAVENOUS
Status: COMPLETED | OUTPATIENT
Start: 2025-08-19 | End: 2025-08-19

## 2025-08-19 RX ADMIN — KETOROLAC TROMETHAMINE 60 MG: 60 INJECTION, SOLUTION INTRAMUSCULAR at 12:08

## 2025-09-02 ENCOUNTER — HOSPITAL ENCOUNTER (EMERGENCY)
Facility: HOSPITAL | Age: 40
Discharge: HOME | End: 2025-09-02
Attending: EMERGENCY MEDICINE
Payer: MEDICAID

## 2025-09-02 VITALS
HEART RATE: 54 BPM | BODY MASS INDEX: 36.48 KG/M2 | SYSTOLIC BLOOD PRESSURE: 136 MMHG | TEMPERATURE: 97 F | OXYGEN SATURATION: 99 % | WEIGHT: 227 LBS | DIASTOLIC BLOOD PRESSURE: 87 MMHG | HEIGHT: 66 IN | RESPIRATION RATE: 20 BRPM

## 2025-09-02 DIAGNOSIS — J30.9 ALLERGIC RHINITIS, UNSPECIFIED SEASONALITY, UNSPECIFIED TRIGGER: Primary | ICD-10-CM

## 2025-09-02 LAB
FLUAV AG UPPER RESP QL IA.RAPID: NOT DETECTED
FLUBV AG UPPER RESP QL IA.RAPID: NOT DETECTED
SARS-COV-2 RNA RESP QL NAA+PROBE: NOT DETECTED

## 2025-09-02 PROCEDURE — 63600175 PHARM REV CODE 636 W HCPCS: Performed by: EMERGENCY MEDICINE

## 2025-09-02 PROCEDURE — 87636 SARSCOV2 & INF A&B AMP PRB: CPT | Performed by: EMERGENCY MEDICINE

## 2025-09-02 PROCEDURE — 99284 EMERGENCY DEPT VISIT MOD MDM: CPT | Mod: 25

## 2025-09-02 PROCEDURE — 96372 THER/PROPH/DIAG INJ SC/IM: CPT | Performed by: EMERGENCY MEDICINE

## 2025-09-02 RX ORDER — DEXAMETHASONE SODIUM PHOSPHATE 4 MG/ML
8 INJECTION, SOLUTION INTRA-ARTICULAR; INTRALESIONAL; INTRAMUSCULAR; INTRAVENOUS; SOFT TISSUE
Status: COMPLETED | OUTPATIENT
Start: 2025-09-02 | End: 2025-09-02

## 2025-09-02 RX ORDER — PREDNISONE 20 MG/1
60 TABLET ORAL DAILY
Qty: 15 TABLET | Refills: 0 | Status: SHIPPED | OUTPATIENT
Start: 2025-09-02 | End: 2025-09-07

## 2025-09-02 RX ORDER — FLUTICASONE PROPIONATE 50 MCG
1 SPRAY, SUSPENSION (ML) NASAL 2 TIMES DAILY
Qty: 15 G | Refills: 0 | Status: SHIPPED | OUTPATIENT
Start: 2025-09-02 | End: 2025-09-12

## 2025-09-02 RX ADMIN — DEXAMETHASONE SODIUM PHOSPHATE 8 MG: 4 INJECTION, SOLUTION INTRA-ARTICULAR; INTRALESIONAL; INTRAMUSCULAR; INTRAVENOUS; SOFT TISSUE at 07:09

## 2025-09-04 ENCOUNTER — OFFICE VISIT (OUTPATIENT)
Dept: SURGERY | Facility: CLINIC | Age: 40
End: 2025-09-04
Payer: MEDICAID

## 2025-09-04 VITALS
DIASTOLIC BLOOD PRESSURE: 83 MMHG | HEIGHT: 66 IN | WEIGHT: 225 LBS | SYSTOLIC BLOOD PRESSURE: 132 MMHG | HEART RATE: 62 BPM | BODY MASS INDEX: 36.16 KG/M2

## 2025-09-04 DIAGNOSIS — Z90.3 HISTORY OF SLEEVE GASTRECTOMY: Primary | ICD-10-CM

## 2025-09-04 DIAGNOSIS — K21.9 GASTROESOPHAGEAL REFLUX DISEASE, UNSPECIFIED WHETHER ESOPHAGITIS PRESENT: ICD-10-CM

## 2025-09-04 PROCEDURE — 1159F MED LIST DOCD IN RCRD: CPT | Mod: CPTII,,, | Performed by: SURGERY

## 2025-09-04 PROCEDURE — 99214 OFFICE O/P EST MOD 30 MIN: CPT | Mod: ,,, | Performed by: SURGERY

## 2025-09-04 PROCEDURE — 3008F BODY MASS INDEX DOCD: CPT | Mod: CPTII,,, | Performed by: SURGERY

## 2025-09-04 PROCEDURE — 3079F DIAST BP 80-89 MM HG: CPT | Mod: CPTII,,, | Performed by: SURGERY

## 2025-09-04 PROCEDURE — 3075F SYST BP GE 130 - 139MM HG: CPT | Mod: CPTII,,, | Performed by: SURGERY

## 2025-09-04 RX ORDER — PANTOPRAZOLE SODIUM 20 MG/1
20 TABLET, DELAYED RELEASE ORAL DAILY
Qty: 30 TABLET | Refills: 11 | Status: SHIPPED | OUTPATIENT
Start: 2025-09-04 | End: 2026-09-04

## 2025-09-05 DIAGNOSIS — K21.9 GASTROESOPHAGEAL REFLUX DISEASE, UNSPECIFIED WHETHER ESOPHAGITIS PRESENT: Primary | ICD-10-CM

## (undated) DEVICE — TROCAR LAPSCP XCEL 12MM 10CM

## (undated) DEVICE — IRRIGATOR HYDRO-SURG PLUS 5MM

## (undated) DEVICE — KIT LAPAROSCOPY UNIVERSITY

## (undated) DEVICE — SYR 10CC LUER LOCK

## (undated) DEVICE — SUT VICRYL+ 27 UR-6 VIOL

## (undated) DEVICE — CLIP ENDO LIGATION LARGE CLIPS

## (undated) DEVICE — STAPLER ECHELON LONG 60X440MM

## (undated) DEVICE — CLOSURE SKIN STERI STRIP 1/2X4

## (undated) DEVICE — SOL IRRI STRL WATER 1000ML

## (undated) DEVICE — KIT SURGICAL TURNOVER

## (undated) DEVICE — GLOVE SENSICARE PI GRN 6.5

## (undated) DEVICE — CANNULA ENDOPATH XCEL 5X100MM

## (undated) DEVICE — TOWEL OR DISP STRL BLUE 4/PK

## (undated) DEVICE — MATTRESS HOVERMAT TRNSF 34X78

## (undated) DEVICE — MANIFOLD 4 PORT

## (undated) DEVICE — RELOAD ECHELON FLEX GRN 60MM

## (undated) DEVICE — TROCAR ENDOPATH XCEL 5X100MM

## (undated) DEVICE — POSITIONER IV ARMBOARD FOAM

## (undated) DEVICE — NDL SAFETY 21G X 1 IN ECLIPSE

## (undated) DEVICE — GLOVE SIGNATURE MICRO LTX 6

## (undated) DEVICE — APPLICATOR CHLORAPREP ORN 26ML

## (undated) DEVICE — SOL NORMAL USPCA 0.9%

## (undated) DEVICE — DRAPE UTILITY STRL 15X26IN

## (undated) DEVICE — GLOVE SIGNATURE MICRO LTX 7

## (undated) DEVICE — GLOVE SURG BIOGEL LATEX SZ 7.5

## (undated) DEVICE — SUT VICRYL PLUS 4-0 FS-2 27IN

## (undated) DEVICE — GOWN POLY REINF BRTH SLV XL

## (undated) DEVICE — GLOVE SIGNATURE MICRO LTX 6.5

## (undated) DEVICE — APPLICATOR VISTASEAL RIG 35CM

## (undated) DEVICE — RELOAD ECHELON FLEX BLU 60MM

## (undated) DEVICE — PAD PREP CUFFED NS 24X48IN

## (undated) DEVICE — TUBING INSUFFLATOR W/ROT CONCT

## (undated) DEVICE — GLOVE SENSICARE NEOPRENE 7

## (undated) DEVICE — BINDER ABD 12IN LG 63-74IN

## (undated) DEVICE — COVER PROXIMA MAYO STAND

## (undated) DEVICE — CLIPPER BLADE MOD 4406 (CAREF)

## (undated) DEVICE — GLOVE SENSICARE PI GRN 7

## (undated) DEVICE — SPONGE GAUZE 4X4 12 PLY STRL

## (undated) DEVICE — TROCAR ENDOPATH XCEL 11MM 10CM

## (undated) DEVICE — SCISSOR CURVED ENDOPATH 5MM

## (undated) DEVICE — HEMOSTAT SURGICEL FIBRLR 2X4IN

## (undated) DEVICE — ADHESIVE MASTISOL VIAL 48/BX

## (undated) DEVICE — SEALANT VISTASEAL FIBRIN 10ML

## (undated) DEVICE — SHEARS HS LONG 5MM CURVED

## (undated) DEVICE — GLOVE SENSICARE PI GRN 7.5

## (undated) DEVICE — COVER TABLE HVY DTY 60X90IN

## (undated) DEVICE — NDL GRANEE OPEN LOOP GRASPER

## (undated) DEVICE — GLOVE SENSICARE NEOPRENE 6

## (undated) DEVICE — TROCAR ENDOPATH XCEL 15MM 10CM

## (undated) DEVICE — GLOVE SIGNATURE MICRO LTX 7.5